# Patient Record
Sex: FEMALE | Race: WHITE | NOT HISPANIC OR LATINO | Employment: OTHER | URBAN - NONMETROPOLITAN AREA
[De-identification: names, ages, dates, MRNs, and addresses within clinical notes are randomized per-mention and may not be internally consistent; named-entity substitution may affect disease eponyms.]

---

## 2017-01-20 RX ORDER — HYDROXYZINE HYDROCHLORIDE 25 MG/1
TABLET, FILM COATED ORAL
Qty: 60 TABLET | Refills: 0 | Status: SHIPPED | OUTPATIENT
Start: 2017-01-20 | End: 2017-04-11 | Stop reason: SDUPTHER

## 2017-01-30 RX ORDER — NADOLOL 40 MG/1
20 TABLET ORAL DAILY
Qty: 15 TABLET | Refills: 30 | Status: SHIPPED | OUTPATIENT
Start: 2017-01-30 | End: 2017-01-31 | Stop reason: SDUPTHER

## 2017-01-31 RX ORDER — NADOLOL 40 MG/1
20 TABLET ORAL DAILY
Qty: 15 TABLET | Refills: 5 | Status: SHIPPED | OUTPATIENT
Start: 2017-01-31 | End: 2017-02-01 | Stop reason: DRUGHIGH

## 2017-02-01 ENCOUNTER — OFFICE VISIT (OUTPATIENT)
Dept: FAMILY MEDICINE CLINIC | Facility: CLINIC | Age: 82
End: 2017-02-01

## 2017-02-01 VITALS
OXYGEN SATURATION: 98 % | TEMPERATURE: 97.8 F | BODY MASS INDEX: 23.92 KG/M2 | DIASTOLIC BLOOD PRESSURE: 52 MMHG | WEIGHT: 130 LBS | HEIGHT: 62 IN | SYSTOLIC BLOOD PRESSURE: 134 MMHG | HEART RATE: 52 BPM

## 2017-02-01 DIAGNOSIS — I95.1 ORTHOSTATIC HYPOTENSION: Primary | ICD-10-CM

## 2017-02-01 DIAGNOSIS — K72.10 CHRONIC LIVER FAILURE WITHOUT HEPATIC COMA (HCC): ICD-10-CM

## 2017-02-01 DIAGNOSIS — I10 ESSENTIAL HYPERTENSION: ICD-10-CM

## 2017-02-01 PROCEDURE — 99214 OFFICE O/P EST MOD 30 MIN: CPT | Performed by: FAMILY MEDICINE

## 2017-02-01 RX ORDER — NADOLOL 20 MG/1
TABLET ORAL
Qty: 30 TABLET | Refills: 6 | Status: SHIPPED | OUTPATIENT
Start: 2017-02-01 | End: 2017-05-05 | Stop reason: CLARIF

## 2017-02-01 NOTE — PROGRESS NOTES
Subjective   Tahira Morrell is a 82 y.o. female.   Chief Complaint   Patient presents with   • Dizziness     at times it's really bad, patient has to sit or lie down. Has been going on for the past two week.        Dizziness   This is a recurrent problem. The current episode started 1 to 4 weeks ago. The problem occurs daily. The problem has been waxing and waning. Associated symptoms include abdominal pain, a change in bowel habit, fatigue, myalgias and weakness. The symptoms are aggravated by bending, standing and walking.        The following portions of the patient's history were reviewed and updated as appropriate: allergies, current medications, past family history, past medical history, past social history, past surgical history and problem list.    Review of Systems   Constitutional: Positive for fatigue.   HENT: Negative.    Eyes: Negative.    Respiratory: Negative.    Cardiovascular: Negative.    Gastrointestinal: Positive for abdominal pain and change in bowel habit.   Endocrine: Negative.    Genitourinary: Negative.    Musculoskeletal: Positive for myalgias.   Skin: Negative.    Allergic/Immunologic: Negative.    Neurological: Positive for dizziness and weakness.   Hematological: Negative.    Psychiatric/Behavioral: Negative.    All other systems reviewed and are negative.      Objective   Physical Exam   Constitutional: She is oriented to person, place, and time. She appears well-developed and well-nourished.   HENT:   Head: Normocephalic and atraumatic.   Right Ear: External ear normal.   Left Ear: External ear normal.   Nose: Nose normal.   Mouth/Throat: Oropharynx is clear and moist.   Eyes: Conjunctivae and EOM are normal. Pupils are equal, round, and reactive to light.   Neck: Normal range of motion. Neck supple.   Cardiovascular: Normal rate, regular rhythm, normal heart sounds and intact distal pulses.  Exam reveals no gallop and no friction rub.    No murmur heard.  Pulmonary/Chest: Effort  normal and breath sounds normal. She has no wheezes. She has no rales.   Abdominal: Soft. Bowel sounds are normal. She exhibits no mass. There is tenderness in the right upper quadrant. There is no rebound and no guarding.   Musculoskeletal: Normal range of motion.        Right knee: Tenderness found.        Left knee: Tenderness found.   Neurological: She is alert and oriented to person, place, and time. She has normal reflexes. No cranial nerve deficit. She exhibits normal muscle tone.   Skin: Skin is warm and dry. No rash noted.   Psychiatric: She has a normal mood and affect. Her speech is normal and behavior is normal. Judgment and thought content normal. Cognition and memory are normal.   Nursing note and vitals reviewed.    Orthostatics negative, bradycardia persists  Assessment/Plan   Tahira was seen today for dizziness.    Diagnoses and all orders for this visit:    Orthostatic hypotension    Essential hypertension    Chronic liver failure without hepatic coma    Other orders  -     nadolol (CORGARD) 20 MG tablet; 1/2 tablet by mouth daily        Obtain lab work that was done recently at her gastroenterologist office

## 2017-02-10 ENCOUNTER — TELEPHONE (OUTPATIENT)
Dept: FAMILY MEDICINE CLINIC | Facility: CLINIC | Age: 82
End: 2017-02-10

## 2017-02-10 NOTE — TELEPHONE ENCOUNTER
Talked to patient due to her concerns about her angina acting up, patient states it didn't start happening till she cut her nadolol back. I advised her to go to [facility] to make sure it wasn't anything more. Due to it being the weekend and doctor not being able to see her for her medication. Patient states she was going to the ER and Watson made her appointment for Monday at 11:00

## 2017-03-13 ENCOUNTER — OFFICE VISIT (OUTPATIENT)
Dept: FAMILY MEDICINE CLINIC | Facility: CLINIC | Age: 82
End: 2017-03-13

## 2017-03-13 VITALS
SYSTOLIC BLOOD PRESSURE: 132 MMHG | HEIGHT: 62 IN | DIASTOLIC BLOOD PRESSURE: 62 MMHG | RESPIRATION RATE: 18 BRPM | HEART RATE: 66 BPM | TEMPERATURE: 98.3 F | WEIGHT: 128 LBS | OXYGEN SATURATION: 95 % | BODY MASS INDEX: 23.55 KG/M2

## 2017-03-13 DIAGNOSIS — N30.00 ACUTE CYSTITIS WITHOUT HEMATURIA: Primary | ICD-10-CM

## 2017-03-13 DIAGNOSIS — K72.10 CHRONIC LIVER FAILURE WITHOUT HEPATIC COMA (HCC): ICD-10-CM

## 2017-03-13 DIAGNOSIS — R50.9 FEVER, UNSPECIFIED FEVER CAUSE: ICD-10-CM

## 2017-03-13 LAB
BILIRUB BLD-MCNC: NEGATIVE MG/DL
CLARITY, POC: CLEAR
COLOR UR: YELLOW
GLUCOSE UR STRIP-MCNC: NEGATIVE MG/DL
KETONES UR QL: NEGATIVE
LEUKOCYTE EST, POC: ABNORMAL
NITRITE UR-MCNC: NEGATIVE MG/ML
PH UR: 6.5 [PH] (ref 5–8)
PROT UR STRIP-MCNC: NEGATIVE MG/DL
RBC # UR STRIP: ABNORMAL /UL
SP GR UR: 1.03 (ref 1–1.03)
UROBILINOGEN UR QL: NORMAL

## 2017-03-13 PROCEDURE — 81003 URINALYSIS AUTO W/O SCOPE: CPT | Performed by: FAMILY MEDICINE

## 2017-03-13 PROCEDURE — 99214 OFFICE O/P EST MOD 30 MIN: CPT | Performed by: FAMILY MEDICINE

## 2017-03-13 RX ORDER — LEVOFLOXACIN 500 MG/1
500 TABLET, FILM COATED ORAL DAILY
Qty: 7 TABLET | Refills: 0 | Status: SHIPPED | OUTPATIENT
Start: 2017-03-13 | End: 2017-05-05

## 2017-03-13 NOTE — PROGRESS NOTES
Subjective   Tahira Morrell is a 82 y.o. female.   Chief Complaint   Patient presents with   • Illness     Fever and chest congestion. Started this weekend.        Illness   This is a new problem. The current episode started 1 to 4 weeks ago. The problem occurs daily. The problem has been gradually worsening. Associated symptoms include abdominal pain, chills, congestion, coughing, fatigue, a fever, headaches, nausea and a sore throat. Pertinent negatives include no rash, urinary symptoms or vomiting. The symptoms are aggravated by eating. She has tried rest (dicyclomine) for the symptoms. The treatment provided moderate relief.        The following portions of the patient's history were reviewed and updated as appropriate: allergies, current medications, past family history, past medical history, past social history, past surgical history and problem list.    Review of Systems   Constitutional: Positive for chills, fatigue and fever.   HENT: Positive for congestion, rhinorrhea and sore throat. Negative for ear pain and sinus pressure.    Eyes: Negative.  Negative for pain and itching.   Respiratory: Positive for cough.    Cardiovascular: Negative.    Gastrointestinal: Positive for abdominal pain, diarrhea and nausea. Negative for abdominal distention, blood in stool and vomiting.   Endocrine: Negative.  Negative for polydipsia and polyuria.   Genitourinary: Positive for pelvic pain. Negative for decreased urine volume, difficulty urinating, dysuria, frequency, hematuria and urgency.   Musculoskeletal: Negative.    Skin: Negative.  Negative for color change and rash.   Allergic/Immunologic: Negative.    Neurological: Positive for dizziness and headaches.   Hematological: Negative.    Psychiatric/Behavioral: Negative.  Negative for confusion and decreased concentration.   All other systems reviewed and are negative.      Objective   Physical Exam   Constitutional: She is oriented to person, place, and time. She  appears well-developed and well-nourished.   HENT:   Head: Normocephalic and atraumatic.   Right Ear: Tympanic membrane, external ear and ear canal normal. No middle ear effusion.   Left Ear: Tympanic membrane, external ear and ear canal normal.  No middle ear effusion.   Nose: Rhinorrhea present.   Mouth/Throat: Mucous membranes are normal. Posterior oropharyngeal erythema present. No oropharyngeal exudate.   Eyes: Conjunctivae and EOM are normal. Pupils are equal, round, and reactive to light.   Neck: Normal range of motion. Neck supple.   Cardiovascular: Normal rate, regular rhythm, normal heart sounds and intact distal pulses.  Exam reveals no gallop and no friction rub.    No murmur heard.  Pulmonary/Chest: Effort normal and breath sounds normal. No respiratory distress. She has no wheezes. She has no rales.   Abdominal: Soft. Bowel sounds are normal. She exhibits no distension and no mass. There is generalized tenderness and tenderness in the suprapubic area. There is no rebound and no guarding. No hernia.   Musculoskeletal: Normal range of motion.   Neurological: She is alert and oriented to person, place, and time. She has normal reflexes. No cranial nerve deficit. She exhibits normal muscle tone.   Skin: Skin is warm and dry. No rash noted. No erythema.   Psychiatric: She has a normal mood and affect. Her behavior is normal. Judgment and thought content normal.   Nursing note and vitals reviewed.    U/A reviewed    Assessment/Plan   Tahira was seen today for illness.    Diagnoses and all orders for this visit:    Acute cystitis without hematuria    Chronic liver failure without hepatic coma    Fever, unspecified fever cause  -     POCT urinalysis dipstick, automated    Other orders  -     levoFLOXacin (LEVAQUIN) 500 MG tablet; Take 1 tablet by mouth Daily.

## 2017-03-24 RX ORDER — SPIRONOLACTONE 50 MG/1
50 TABLET, FILM COATED ORAL 2 TIMES DAILY
Qty: 60 TABLET | Refills: 5 | Status: SHIPPED | OUTPATIENT
Start: 2017-03-24

## 2017-04-12 RX ORDER — HYDROXYZINE HYDROCHLORIDE 25 MG/1
TABLET, FILM COATED ORAL
Qty: 60 TABLET | Refills: 0 | Status: SHIPPED | OUTPATIENT
Start: 2017-04-12 | End: 2017-06-08 | Stop reason: SDUPTHER

## 2017-04-28 RX ORDER — SUCRALFATE 1 G/1
1 TABLET ORAL
Qty: 120 TABLET | Refills: 5 | Status: SHIPPED | OUTPATIENT
Start: 2017-04-28 | End: 2018-07-31 | Stop reason: SDUPTHER

## 2017-05-05 ENCOUNTER — LAB (OUTPATIENT)
Dept: LAB | Facility: OTHER | Age: 82
End: 2017-05-05

## 2017-05-05 ENCOUNTER — OFFICE VISIT (OUTPATIENT)
Dept: FAMILY MEDICINE CLINIC | Facility: CLINIC | Age: 82
End: 2017-05-05

## 2017-05-05 VITALS
OXYGEN SATURATION: 99 % | DIASTOLIC BLOOD PRESSURE: 60 MMHG | RESPIRATION RATE: 16 BRPM | HEART RATE: 68 BPM | WEIGHT: 130 LBS | BODY MASS INDEX: 23.92 KG/M2 | SYSTOLIC BLOOD PRESSURE: 122 MMHG | TEMPERATURE: 97.9 F | HEIGHT: 62 IN

## 2017-05-05 DIAGNOSIS — R18.8 CIRRHOSIS OF LIVER WITH ASCITES, UNSPECIFIED HEPATIC CIRRHOSIS TYPE (HCC): ICD-10-CM

## 2017-05-05 DIAGNOSIS — K72.10 CHRONIC LIVER FAILURE WITHOUT HEPATIC COMA (HCC): ICD-10-CM

## 2017-05-05 DIAGNOSIS — K74.60 CIRRHOSIS OF LIVER WITH ASCITES, UNSPECIFIED HEPATIC CIRRHOSIS TYPE (HCC): ICD-10-CM

## 2017-05-05 DIAGNOSIS — I10 ESSENTIAL HYPERTENSION: ICD-10-CM

## 2017-05-05 DIAGNOSIS — E11.8 TYPE 2 DIABETES MELLITUS WITH COMPLICATION, WITHOUT LONG-TERM CURRENT USE OF INSULIN (HCC): ICD-10-CM

## 2017-05-05 DIAGNOSIS — R50.9 FEVER, UNSPECIFIED FEVER CAUSE: ICD-10-CM

## 2017-05-05 DIAGNOSIS — N12 PYELONEPHRITIS: Primary | ICD-10-CM

## 2017-05-05 LAB
ALBUMIN SERPL-MCNC: 3.6 G/DL (ref 3.2–5.5)
ALBUMIN/GLOB SERPL: 0.9 G/DL (ref 1–3)
ALP SERPL-CCNC: 68 U/L (ref 15–121)
ALT SERPL W P-5'-P-CCNC: 22 U/L (ref 10–60)
AMMONIA BLD-SCNC: 19 UMOL/L (ref 9–30)
ANION GAP SERPL CALCULATED.3IONS-SCNC: 10 MMOL/L (ref 5–15)
AST SERPL-CCNC: 31 U/L (ref 10–60)
BILIRUB BLD-MCNC: NEGATIVE MG/DL
BILIRUB SERPL-MCNC: 1 MG/DL (ref 0.2–1)
BUN BLD-MCNC: 19 MG/DL (ref 8–25)
BUN/CREAT SERPL: 23.8 (ref 7–25)
CALCIUM SPEC-SCNC: 9.1 MG/DL (ref 8.4–10.8)
CHLORIDE SERPL-SCNC: 107 MMOL/L (ref 100–112)
CHOLEST SERPL-MCNC: 196 MG/DL (ref 150–200)
CLARITY, POC: CLEAR
CO2 SERPL-SCNC: 27 MMOL/L (ref 20–32)
COLOR UR: ABNORMAL
CREAT BLD-MCNC: 0.8 MG/DL (ref 0.4–1.3)
DEPRECATED RDW RBC AUTO: 46.1 FL (ref 36.4–46.3)
EOSINOPHIL # BLD MANUAL: 0.12 10*3/MM3 (ref 0–0.7)
EOSINOPHIL NFR BLD MANUAL: 3 % (ref 0–7)
ERYTHROCYTE [DISTWIDTH] IN BLOOD BY AUTOMATED COUNT: 13.1 % (ref 11.5–14.5)
GFR SERPL CREATININE-BSD FRML MDRD: 69 ML/MIN/1.73 (ref 39–90)
GLOBULIN UR ELPH-MCNC: 3.8 GM/DL (ref 2.5–4.6)
GLUCOSE BLD-MCNC: 106 MG/DL (ref 70–100)
GLUCOSE UR STRIP-MCNC: NEGATIVE MG/DL
HBA1C MFR BLD: 5.56 % (ref 4–5.6)
HCT VFR BLD AUTO: 37.6 % (ref 35–45)
HDLC SERPL-MCNC: 55 MG/DL (ref 35–100)
HGB BLD-MCNC: 13 G/DL (ref 12–15.5)
KETONES UR QL: NEGATIVE
LARGE PLATELETS: ABNORMAL
LDLC SERPL CALC-MCNC: 126 MG/DL
LDLC/HDLC SERPL: 2.29 {RATIO}
LEUKOCYTE EST, POC: ABNORMAL
LYMPHOCYTES # BLD MANUAL: 1.5 10*3/MM3 (ref 0.6–4.2)
LYMPHOCYTES NFR BLD MANUAL: 14 % (ref 0–12)
LYMPHOCYTES NFR BLD MANUAL: 39 % (ref 10–50)
MCH RBC QN AUTO: 34.9 PG (ref 26.5–34)
MCHC RBC AUTO-ENTMCNC: 34.6 G/DL (ref 31.4–36)
MCV RBC AUTO: 100.8 FL (ref 80–98)
MONOCYTES # BLD AUTO: 0.54 10*3/MM3 (ref 0–0.9)
NEUTROPHILS # BLD AUTO: 1.69 10*3/MM3 (ref 2–8.6)
NEUTROPHILS NFR BLD MANUAL: 44 % (ref 37–80)
NITRITE UR-MCNC: NEGATIVE MG/ML
PH UR: 6 [PH] (ref 5–8)
PLATELET # BLD AUTO: 83 10*3/MM3 (ref 150–450)
PMV BLD AUTO: 11.5 FL (ref 8–12)
POTASSIUM BLD-SCNC: 4.9 MMOL/L (ref 3.4–5.4)
PROT SERPL-MCNC: 7.4 G/DL (ref 6.7–8.2)
PROT UR STRIP-MCNC: NEGATIVE MG/DL
RBC # BLD AUTO: 3.73 10*6/MM3 (ref 3.77–5.16)
RBC # UR STRIP: ABNORMAL /UL
RBC MORPH BLD: NORMAL
SMALL PLATELETS BLD QL SMEAR: ABNORMAL
SODIUM BLD-SCNC: 144 MMOL/L (ref 134–146)
SP GR UR: 1.01 (ref 1–1.03)
TRIGL SERPL-MCNC: 75 MG/DL (ref 35–160)
UROBILINOGEN UR QL: NORMAL
VLDLC SERPL-MCNC: 15 MG/DL
WBC MORPH BLD: NORMAL
WBC NRBC COR # BLD: 3.84 10*3/MM3 (ref 3.2–9.8)

## 2017-05-05 PROCEDURE — 36415 COLL VENOUS BLD VENIPUNCTURE: CPT | Performed by: FAMILY MEDICINE

## 2017-05-05 PROCEDURE — 99214 OFFICE O/P EST MOD 30 MIN: CPT | Performed by: FAMILY MEDICINE

## 2017-05-05 PROCEDURE — 96372 THER/PROPH/DIAG INJ SC/IM: CPT | Performed by: FAMILY MEDICINE

## 2017-05-05 PROCEDURE — 80061 LIPID PANEL: CPT | Performed by: FAMILY MEDICINE

## 2017-05-05 PROCEDURE — 82140 ASSAY OF AMMONIA: CPT | Performed by: FAMILY MEDICINE

## 2017-05-05 PROCEDURE — 81003 URINALYSIS AUTO W/O SCOPE: CPT | Performed by: FAMILY MEDICINE

## 2017-05-05 PROCEDURE — 85025 COMPLETE CBC W/AUTO DIFF WBC: CPT | Performed by: FAMILY MEDICINE

## 2017-05-05 PROCEDURE — 83036 HEMOGLOBIN GLYCOSYLATED A1C: CPT | Performed by: FAMILY MEDICINE

## 2017-05-05 PROCEDURE — 80053 COMPREHEN METABOLIC PANEL: CPT | Performed by: FAMILY MEDICINE

## 2017-05-05 RX ORDER — CIPROFLOXACIN 500 MG/1
500 TABLET, FILM COATED ORAL 2 TIMES DAILY
Qty: 14 TABLET | Refills: 0 | Status: SHIPPED | OUTPATIENT
Start: 2017-05-05 | End: 2017-05-12 | Stop reason: SDUPTHER

## 2017-05-05 RX ORDER — METOPROLOL SUCCINATE 25 MG/1
1 TABLET, EXTENDED RELEASE ORAL DAILY
COMMUNITY
Start: 2017-04-26

## 2017-05-05 RX ORDER — CEFTRIAXONE 1 G/1
1 INJECTION, POWDER, FOR SOLUTION INTRAMUSCULAR; INTRAVENOUS ONCE
Status: COMPLETED | OUTPATIENT
Start: 2017-05-05 | End: 2017-05-05

## 2017-05-05 RX ADMIN — CEFTRIAXONE 1 G: 1 INJECTION, POWDER, FOR SOLUTION INTRAMUSCULAR; INTRAVENOUS at 11:00

## 2017-05-12 ENCOUNTER — OFFICE VISIT (OUTPATIENT)
Dept: FAMILY MEDICINE CLINIC | Facility: CLINIC | Age: 82
End: 2017-05-12

## 2017-05-12 VITALS
HEART RATE: 60 BPM | WEIGHT: 130 LBS | OXYGEN SATURATION: 99 % | SYSTOLIC BLOOD PRESSURE: 124 MMHG | TEMPERATURE: 97.2 F | DIASTOLIC BLOOD PRESSURE: 80 MMHG | BODY MASS INDEX: 23.92 KG/M2 | HEIGHT: 62 IN

## 2017-05-12 DIAGNOSIS — D69.6 THROMBOCYTOPENIA (HCC): ICD-10-CM

## 2017-05-12 DIAGNOSIS — K72.10 CHRONIC LIVER FAILURE WITHOUT HEPATIC COMA (HCC): ICD-10-CM

## 2017-05-12 DIAGNOSIS — E53.8 B12 DEFICIENCY: ICD-10-CM

## 2017-05-12 DIAGNOSIS — N12 PYELONEPHRITIS: ICD-10-CM

## 2017-05-12 DIAGNOSIS — I10 ESSENTIAL HYPERTENSION: Primary | ICD-10-CM

## 2017-05-12 DIAGNOSIS — F33.0 MILD EPISODE OF RECURRENT MAJOR DEPRESSIVE DISORDER (HCC): ICD-10-CM

## 2017-05-12 DIAGNOSIS — E11.9 TYPE 2 DIABETES MELLITUS WITHOUT COMPLICATION, WITHOUT LONG-TERM CURRENT USE OF INSULIN (HCC): ICD-10-CM

## 2017-05-12 PROCEDURE — 99214 OFFICE O/P EST MOD 30 MIN: CPT | Performed by: FAMILY MEDICINE

## 2017-05-12 RX ORDER — CIPROFLOXACIN 500 MG/1
500 TABLET, FILM COATED ORAL 2 TIMES DAILY
Qty: 14 TABLET | Refills: 0 | Status: SHIPPED | OUTPATIENT
Start: 2017-05-12 | End: 2017-11-14

## 2017-05-15 RX ORDER — FLUTICASONE PROPIONATE 50 MCG
SPRAY, SUSPENSION (ML) NASAL
Qty: 16 G | Refills: 1 | Status: SHIPPED | OUTPATIENT
Start: 2017-05-15

## 2017-06-08 RX ORDER — HYDROXYZINE HYDROCHLORIDE 25 MG/1
TABLET, FILM COATED ORAL
Qty: 60 TABLET | Refills: 0 | Status: SHIPPED | OUTPATIENT
Start: 2017-06-08 | End: 2017-07-24 | Stop reason: SDUPTHER

## 2017-07-25 RX ORDER — HYDROXYZINE HYDROCHLORIDE 25 MG/1
TABLET, FILM COATED ORAL
Qty: 60 TABLET | Refills: 0 | Status: SHIPPED | OUTPATIENT
Start: 2017-07-25 | End: 2017-09-25 | Stop reason: SDUPTHER

## 2017-09-25 RX ORDER — HYDROXYZINE HYDROCHLORIDE 25 MG/1
TABLET, FILM COATED ORAL
Qty: 60 TABLET | Refills: 5 | Status: SHIPPED | OUTPATIENT
Start: 2017-09-25 | End: 2018-10-13 | Stop reason: SDUPTHER

## 2017-11-07 ENCOUNTER — LAB (OUTPATIENT)
Dept: LAB | Facility: OTHER | Age: 82
End: 2017-11-07

## 2017-11-07 DIAGNOSIS — D69.6 THROMBOCYTOPENIA (HCC): ICD-10-CM

## 2017-11-07 DIAGNOSIS — E11.9 TYPE 2 DIABETES MELLITUS WITHOUT COMPLICATION, WITHOUT LONG-TERM CURRENT USE OF INSULIN (HCC): ICD-10-CM

## 2017-11-07 LAB
ALBUMIN SERPL-MCNC: 3.5 G/DL (ref 3.2–5.5)
ALBUMIN/GLOB SERPL: 0.9 G/DL (ref 1–3)
ALP SERPL-CCNC: 85 U/L (ref 15–121)
ALT SERPL W P-5'-P-CCNC: 20 U/L (ref 10–60)
ANION GAP SERPL CALCULATED.3IONS-SCNC: 8 MMOL/L (ref 5–15)
ANISOCYTOSIS BLD QL: NORMAL
ARTICHOKE IGE QN: 114 MG/DL (ref 0–129)
AST SERPL-CCNC: 37 U/L (ref 10–60)
BILIRUB SERPL-MCNC: 1.3 MG/DL (ref 0.2–1)
BUN BLD-MCNC: 12 MG/DL (ref 8–25)
BUN/CREAT SERPL: 15 (ref 7–25)
CALCIUM SPEC-SCNC: 8.8 MG/DL (ref 8.4–10.8)
CHLORIDE SERPL-SCNC: 108 MMOL/L (ref 100–112)
CO2 SERPL-SCNC: 26 MMOL/L (ref 20–32)
CREAT BLD-MCNC: 0.8 MG/DL (ref 0.4–1.3)
DEPRECATED RDW RBC AUTO: 51.5 FL (ref 36.4–46.3)
EOSINOPHIL # BLD MANUAL: 0.2 10*3/MM3 (ref 0–0.7)
EOSINOPHIL NFR BLD MANUAL: 5 % (ref 0–7)
ERYTHROCYTE [DISTWIDTH] IN BLOOD BY AUTOMATED COUNT: 15.6 % (ref 11.5–14.5)
GFR SERPL CREATININE-BSD FRML MDRD: 69 ML/MIN/1.73 (ref 39–90)
GLOBULIN UR ELPH-MCNC: 4 GM/DL (ref 2.5–4.6)
GLUCOSE BLD-MCNC: 105 MG/DL (ref 70–100)
HBA1C MFR BLD: 5.5 % (ref 4–5.6)
HCT VFR BLD AUTO: 38.2 % (ref 35–45)
HGB BLD-MCNC: 12.3 G/DL (ref 12–15.5)
LYMPHOCYTES # BLD MANUAL: 0.98 10*3/MM3 (ref 0.6–4.2)
LYMPHOCYTES NFR BLD MANUAL: 25 % (ref 10–50)
LYMPHOCYTES NFR BLD MANUAL: 6 % (ref 0–12)
MCH RBC QN AUTO: 30.8 PG (ref 26.5–34)
MCHC RBC AUTO-ENTMCNC: 32.2 G/DL (ref 31.4–36)
MCV RBC AUTO: 95.5 FL (ref 80–98)
MONOCYTES # BLD AUTO: 0.23 10*3/MM3 (ref 0–0.9)
NEUTROPHILS # BLD AUTO: 2.5 10*3/MM3 (ref 2–8.6)
NEUTROPHILS NFR BLD MANUAL: 64 % (ref 37–80)
PLATELET # BLD AUTO: 96 10*3/MM3 (ref 150–450)
PMV BLD AUTO: 11.5 FL (ref 8–12)
POTASSIUM BLD-SCNC: 4.2 MMOL/L (ref 3.4–5.4)
PROT SERPL-MCNC: 7.5 G/DL (ref 6.7–8.2)
RBC # BLD AUTO: 4 10*6/MM3 (ref 3.77–5.16)
SMALL PLATELETS BLD QL SMEAR: NORMAL
SODIUM BLD-SCNC: 142 MMOL/L (ref 134–146)
WBC MORPH BLD: NORMAL
WBC NRBC COR # BLD: 3.9 10*3/MM3 (ref 3.2–9.8)

## 2017-11-07 PROCEDURE — 85025 COMPLETE CBC W/AUTO DIFF WBC: CPT | Performed by: FAMILY MEDICINE

## 2017-11-07 PROCEDURE — 83721 ASSAY OF BLOOD LIPOPROTEIN: CPT | Performed by: FAMILY MEDICINE

## 2017-11-07 PROCEDURE — 80053 COMPREHEN METABOLIC PANEL: CPT | Performed by: FAMILY MEDICINE

## 2017-11-07 PROCEDURE — 36415 COLL VENOUS BLD VENIPUNCTURE: CPT | Performed by: FAMILY MEDICINE

## 2017-11-07 PROCEDURE — 83036 HEMOGLOBIN GLYCOSYLATED A1C: CPT | Performed by: FAMILY MEDICINE

## 2017-11-14 ENCOUNTER — OFFICE VISIT (OUTPATIENT)
Dept: FAMILY MEDICINE CLINIC | Facility: CLINIC | Age: 82
End: 2017-11-14

## 2017-11-14 VITALS
OXYGEN SATURATION: 97 % | TEMPERATURE: 98.7 F | BODY MASS INDEX: 26.31 KG/M2 | HEART RATE: 70 BPM | WEIGHT: 143 LBS | DIASTOLIC BLOOD PRESSURE: 76 MMHG | SYSTOLIC BLOOD PRESSURE: 116 MMHG | RESPIRATION RATE: 16 BRPM | HEIGHT: 62 IN

## 2017-11-14 DIAGNOSIS — F33.0 MILD EPISODE OF RECURRENT MAJOR DEPRESSIVE DISORDER (HCC): ICD-10-CM

## 2017-11-14 DIAGNOSIS — E11.9 TYPE 2 DIABETES MELLITUS WITHOUT COMPLICATION, WITHOUT LONG-TERM CURRENT USE OF INSULIN (HCC): ICD-10-CM

## 2017-11-14 DIAGNOSIS — R18.8 OTHER ASCITES: ICD-10-CM

## 2017-11-14 DIAGNOSIS — I10 ESSENTIAL HYPERTENSION: ICD-10-CM

## 2017-11-14 DIAGNOSIS — K72.10 CHRONIC LIVER FAILURE WITHOUT HEPATIC COMA (HCC): Primary | ICD-10-CM

## 2017-11-14 PROCEDURE — 99214 OFFICE O/P EST MOD 30 MIN: CPT | Performed by: FAMILY MEDICINE

## 2017-11-14 RX ORDER — CIPROFLOXACIN 500 MG/1
500 TABLET, FILM COATED ORAL 2 TIMES DAILY
Qty: 14 TABLET | Refills: 0 | Status: SHIPPED | OUTPATIENT
Start: 2017-11-14 | End: 2018-01-23

## 2017-11-14 RX ORDER — DULOXETIN HYDROCHLORIDE 30 MG/1
30 CAPSULE, DELAYED RELEASE ORAL DAILY
Qty: 30 CAPSULE | Refills: 0 | Status: SHIPPED | OUTPATIENT
Start: 2017-11-14 | End: 2017-12-12 | Stop reason: SINTOL

## 2017-11-14 NOTE — PROGRESS NOTES
Subjective   Tahira Morrell is a 83 y.o. female who presents to the office for follow-up and review of labs.     History of Present Illness   Patient with multiple medical problems including hypertension, type 2 diabetes, and cirrhosis of the liver is in today for reevaluation and to review labs.  Patient has several issues going on at this time.  She has had increased abdominal pain over the last 2 months requiring visits ×2 to the emergency department.  A CT of abdomen and pelvis were done at last visit showing mucosal edema in the residual: With infectious versus inflammatory versus ischemic etiology.  She is having difficulty emptying her bowels.  Also is having increased depression with frequent crying spells.  Additional history has been obtained from review of hospital records including CT report.    The following portions of the patient's history were reviewed and updated as appropriate: allergies, current medications, past family history, past medical history, past social history, past surgical history and problem list.    Review of Systems   Constitutional: Positive for activity change and fatigue.   HENT: Negative.    Eyes: Negative.    Respiratory: Negative.    Cardiovascular: Negative.    Gastrointestinal: Positive for abdominal distention, constipation, diarrhea and nausea.   Endocrine: Negative.    Genitourinary: Negative.    Musculoskeletal: Positive for arthralgias and back pain.   Skin: Negative.    Allergic/Immunologic: Negative.    Neurological: Negative.    Hematological: Negative.    Psychiatric/Behavioral: Negative.    All other systems reviewed and are negative.      Objective   Physical Exam   Constitutional: She is oriented to person, place, and time. She appears well-developed and well-nourished.   HENT:   Head: Normocephalic and atraumatic.   Right Ear: External ear normal.   Left Ear: External ear normal.   Nose: Nose normal.   Mouth/Throat: Oropharynx is clear and moist.   Eyes:  Conjunctivae and EOM are normal. Pupils are equal, round, and reactive to light.   Neck: Normal range of motion. Neck supple.   Cardiovascular: Normal rate, regular rhythm, normal heart sounds and intact distal pulses.  Exam reveals no gallop and no friction rub.    No murmur heard.  Pulmonary/Chest: Effort normal and breath sounds normal. She has no wheezes. She has no rales.   Abdominal: Soft. Bowel sounds are normal. She exhibits mass. There is tenderness. There is no rebound and no guarding. A hernia is present.   Musculoskeletal: Normal range of motion.   Neurological: She is alert and oriented to person, place, and time. She has normal reflexes. No cranial nerve deficit. She exhibits normal muscle tone.   Skin: Skin is warm and dry. No rash noted.   Psychiatric: Her behavior is normal. Judgment and thought content normal. Her mood appears anxious. She exhibits a depressed mood.   Nursing note and vitals reviewed.      Assessment/Plan   Tahira was seen today for hypertension and diabetes.    Diagnoses and all orders for this visit:    Chronic liver failure without hepatic coma    Essential hypertension    Type 2 diabetes mellitus without complication, without long-term current use of insulin    Mild episode of recurrent major depressive disorder    Other ascites    Other orders  -     ciprofloxacin (CIPRO) 500 MG tablet; Take 1 tablet by mouth 2 (Two) Times a Day.  -     DULoxetine (CYMBALTA) 30 MG capsule; Take 1 capsule by mouth Daily.         Labs are reviewed with patient.    PHQ-2/PHQ-9 Depression Screening 11/14/2017   Little interest or pleasure in doing things 3   Feeling down, depressed, or hopeless 3   Trouble falling or staying asleep, or sleeping too much 3   Feeling tired or having little energy 3   Poor appetite or overeating 3   Feeling bad about yourself - or that you are a failure or have let yourself or your family down 3   Trouble concentrating on things, such as reading the newspaper or  watching television 3   Moving or speaking so slowly that other people could have noticed. Or the opposite - being so fidgety or restless that you have been moving around a lot more than usual 3   Thoughts that you would be better off dead, or of hurting yourself in some way 3   Total Score 27   If you checked off any problems, how difficult have these problems made it for you to do your work, take care of things at home, or get along with other people? Extremely dIfficult         Lab on 11/07/2017   Component Date Value Ref Range Status   • Glucose 11/07/2017 105* 70 - 100 mg/dL Final   • BUN 11/07/2017 12  8 - 25 mg/dL Final   • Creatinine 11/07/2017 0.80  0.40 - 1.30 mg/dL Final   • Sodium 11/07/2017 142  134 - 146 mmol/L Final   • Potassium 11/07/2017 4.2  3.4 - 5.4 mmol/L Final   • Chloride 11/07/2017 108  100 - 112 mmol/L Final   • CO2 11/07/2017 26.0  20.0 - 32.0 mmol/L Final   • Calcium 11/07/2017 8.8  8.4 - 10.8 mg/dL Final   • Total Protein 11/07/2017 7.5  6.7 - 8.2 g/dL Final   • Albumin 11/07/2017 3.50  3.20 - 5.50 g/dL Final   • ALT (SGPT) 11/07/2017 20  10 - 60 U/L Final   • AST (SGOT) 11/07/2017 37  10 - 60 U/L Final   • Alkaline Phosphatase 11/07/2017 85  15 - 121 U/L Final   • Total Bilirubin 11/07/2017 1.3* 0.2 - 1.0 mg/dL Final   • eGFR Non African Amer 11/07/2017 69  39 - 90 mL/min/1.73 Final   • Globulin 11/07/2017 4.0  2.5 - 4.6 gm/dL Final   • A/G Ratio 11/07/2017 0.9* 1.0 - 3.0 g/dL Final   • BUN/Creatinine Ratio 11/07/2017 15.0  7.0 - 25.0 Final   • Anion Gap 11/07/2017 8.0  5.0 - 15.0 mmol/L Final   • Hemoglobin A1C 11/07/2017 5.5  4 - 5.6 % Final   • LDL Cholesterol  11/07/2017 114  0 - 129 mg/dL Final   • WBC 11/07/2017 3.90  3.20 - 9.80 10*3/mm3 Final   • RBC 11/07/2017 4.00  3.77 - 5.16 10*6/mm3 Final   • Hemoglobin 11/07/2017 12.3  12.0 - 15.5 g/dL Final   • Hematocrit 11/07/2017 38.2  35.0 - 45.0 % Final   • MCV 11/07/2017 95.5  80.0 - 98.0 fL Final   • MCH 11/07/2017 30.8  26.5 -  34.0 pg Final   • MCHC 11/07/2017 32.2  31.4 - 36.0 g/dL Final   • RDW 11/07/2017 15.6* 11.5 - 14.5 % Final   • RDW-SD 11/07/2017 51.5* 36.4 - 46.3 fl Final   • MPV 11/07/2017 11.5  8.0 - 12.0 fL Final   • Platelets 11/07/2017 96* 150 - 450 10*3/mm3 Final   • Neutrophil % 11/07/2017 64.0  37.0 - 80.0 % Final   • Lymphocyte % 11/07/2017 25.0  10.0 - 50.0 % Final   • Monocyte % 11/07/2017 6.0  0.0 - 12.0 % Final   • Eosinophil % 11/07/2017 5.0  0.0 - 7.0 % Final   • Neutrophils Absolute 11/07/2017 2.50  2.00 - 8.60 10*3/mm3 Final   • Lymphocytes Absolute 11/07/2017 0.98  0.60 - 4.20 10*3/mm3 Final   • Monocytes Absolute 11/07/2017 0.23  0.00 - 0.90 10*3/mm3 Final   • Eosinophils Absolute 11/07/2017 0.20  0.00 - 0.70 10*3/mm3 Final   • Anisocytosis 11/07/2017 Slight/1+  None Seen Final   • WBC Morphology 11/07/2017 Normal  Normal Final   • Platelet Estimate 11/07/2017 Decreased  Normal Final   Admission on 07/27/2017, Discharged on 07/27/2017   Component Date Value Ref Range Status   • Color 07/27/2017 Sudha  Yellow, Straw, Dark Yellow, Sudha Final   • Clarity, UA 07/27/2017 Slightly Cloudy* Clear Final   • Glucose, UA 07/27/2017 Negative  Negative, 1000 mg/dL (3+) mg/dL Final   • Bilirubin 07/27/2017 1 mg/dL* Negative Final   • Ketones, UA 07/27/2017 Trace* Negative Final   • Specific Gravity  07/27/2017 1.030  1.005 - 1.030 Final   • Blood, UA 07/27/2017 1+* Negative Final   • pH, Urine 07/27/2017 5.5  5.0 - 8.0 Final   • Protein, POC 07/27/2017 1+* Negative mg/dL Final   • Urobilinogen, UA 07/27/2017 Normal  Normal Final   • Leukocytes 07/27/2017 Trace* Negative Final   • Nitrite, UA 07/27/2017 Negative  Negative Final   ]

## 2017-12-12 ENCOUNTER — OFFICE VISIT (OUTPATIENT)
Dept: FAMILY MEDICINE CLINIC | Facility: CLINIC | Age: 82
End: 2017-12-12

## 2017-12-12 VITALS
BODY MASS INDEX: 25.21 KG/M2 | HEART RATE: 63 BPM | HEIGHT: 62 IN | DIASTOLIC BLOOD PRESSURE: 76 MMHG | OXYGEN SATURATION: 98 % | WEIGHT: 137 LBS | TEMPERATURE: 98.2 F | SYSTOLIC BLOOD PRESSURE: 124 MMHG

## 2017-12-12 DIAGNOSIS — F33.0 MILD EPISODE OF RECURRENT MAJOR DEPRESSIVE DISORDER (HCC): Primary | ICD-10-CM

## 2017-12-12 DIAGNOSIS — I10 ESSENTIAL HYPERTENSION: ICD-10-CM

## 2017-12-12 DIAGNOSIS — K72.10 CHRONIC LIVER FAILURE WITHOUT HEPATIC COMA (HCC): ICD-10-CM

## 2017-12-12 PROCEDURE — 99213 OFFICE O/P EST LOW 20 MIN: CPT | Performed by: FAMILY MEDICINE

## 2017-12-12 NOTE — PROGRESS NOTES
Subjective   Tahira Morrell is a 83 y.o. female.   Chief Complaint   Patient presents with   • Follow-up     4 wk       Depression   Visit Type: follow-up  Patient presents with the following symptoms: depressed mood, fatigue, insomnia and nervousness/anxiety.  Patient is not experiencing: suicidal ideas and suicidal planning.  Frequency of symptoms: most days   Severity: causing significant distress   Sleep quality: poor  Nighttime awakenings: several  Side effects:  Insomnia (tremor)       The following portions of the patient's history were reviewed and updated as appropriate: allergies, current medications, past family history, past medical history, past social history, past surgical history and problem list.    Review of Systems   Constitutional: Positive for activity change and fatigue.   HENT: Negative.    Eyes: Negative.    Respiratory: Negative.    Cardiovascular: Negative.    Gastrointestinal: Positive for abdominal distention, constipation, diarrhea and nausea.   Endocrine: Negative.    Genitourinary: Negative.    Musculoskeletal: Positive for arthralgias and back pain.   Skin: Negative.    Allergic/Immunologic: Negative.    Neurological: Negative.    Hematological: Negative.    Psychiatric/Behavioral: Positive for dysphoric mood. Negative for suicidal ideas. The patient is nervous/anxious and has insomnia.    All other systems reviewed and are negative.      Objective   Physical Exam   Constitutional: She is oriented to person, place, and time. She appears well-developed and well-nourished.   HENT:   Head: Normocephalic and atraumatic.   Right Ear: External ear normal.   Left Ear: External ear normal.   Nose: Nose normal.   Mouth/Throat: Oropharynx is clear and moist.   Eyes: Conjunctivae and EOM are normal. Pupils are equal, round, and reactive to light.   Neck: Normal range of motion. Neck supple.   Cardiovascular: Normal rate, regular rhythm, normal heart sounds and intact distal pulses.  Exam  reveals no gallop and no friction rub.    No murmur heard.  Pulmonary/Chest: Effort normal and breath sounds normal. She has no wheezes. She has no rales.   Abdominal: Soft. Bowel sounds are normal. She exhibits mass. There is tenderness. There is no rebound and no guarding. A hernia is present.   Musculoskeletal: Normal range of motion.   Neurological: She is alert and oriented to person, place, and time. She has normal reflexes. No cranial nerve deficit. She exhibits normal muscle tone.   Skin: Skin is warm and dry. No rash noted.   Psychiatric: Her behavior is normal. Judgment and thought content normal. Her mood appears anxious. She exhibits a depressed mood.   Nursing note and vitals reviewed.      Assessment/Plan   Tahira was seen today for follow-up.    Diagnoses and all orders for this visit:    Mild episode of recurrent major depressive disorder    Essential hypertension    Chronic liver failure without hepatic coma    Other orders  -     sertraline (ZOLOFT) 50 MG tablet; Take 1 tablet by mouth Daily.

## 2018-01-02 RX ORDER — OMEPRAZOLE 20 MG/1
CAPSULE, DELAYED RELEASE ORAL
Qty: 60 CAPSULE | Refills: 5 | Status: SHIPPED | OUTPATIENT
Start: 2018-01-02

## 2018-01-02 RX ORDER — DICYCLOMINE HCL 20 MG
TABLET ORAL
Qty: 120 TABLET | Refills: 5 | Status: SHIPPED | OUTPATIENT
Start: 2018-01-02

## 2018-01-23 ENCOUNTER — OFFICE VISIT (OUTPATIENT)
Dept: FAMILY MEDICINE CLINIC | Facility: CLINIC | Age: 83
End: 2018-01-23

## 2018-01-23 VITALS
SYSTOLIC BLOOD PRESSURE: 112 MMHG | RESPIRATION RATE: 16 BRPM | BODY MASS INDEX: 25.73 KG/M2 | DIASTOLIC BLOOD PRESSURE: 60 MMHG | TEMPERATURE: 96.2 F | HEIGHT: 62 IN | OXYGEN SATURATION: 95 % | WEIGHT: 139.8 LBS | HEART RATE: 68 BPM

## 2018-01-23 DIAGNOSIS — F33.0 MILD EPISODE OF RECURRENT MAJOR DEPRESSIVE DISORDER (HCC): Primary | ICD-10-CM

## 2018-01-23 DIAGNOSIS — K72.10 CHRONIC LIVER FAILURE WITHOUT HEPATIC COMA (HCC): ICD-10-CM

## 2018-01-23 DIAGNOSIS — I10 ESSENTIAL HYPERTENSION: ICD-10-CM

## 2018-01-23 PROCEDURE — 99213 OFFICE O/P EST LOW 20 MIN: CPT | Performed by: FAMILY MEDICINE

## 2018-01-23 RX ORDER — SERTRALINE HYDROCHLORIDE 100 MG/1
100 TABLET, FILM COATED ORAL DAILY
Qty: 30 TABLET | Refills: 5 | Status: SHIPPED | OUTPATIENT
Start: 2018-01-23 | End: 2018-02-20 | Stop reason: SINTOL

## 2018-01-23 NOTE — PROGRESS NOTES
Subjective   Tahira Morrell is a 83 y.o. female.   Chief Complaint   Patient presents with   • Depression     6 wk f/u   • Abdominal Pain     patient has been having lower abdomen pain.       Depression   Visit Type: follow-up  Patient presents with the following symptoms: fatigue, feelings of worthlessness and nervousness/anxiety.  Patient is not experiencing: anhedonia, depressed mood, excessive worry, insomnia, irritability, obsessions, panic, suicidal ideas, suicidal planning and thoughts of death.  Frequency of symptoms: rarely   Severity: mild   Sleep quality: fair  Nighttime awakenings: occasional  Compliance with medications:  %  Treatment side effects: none.       The following portions of the patient's history were reviewed and updated as appropriate: allergies, current medications, past family history, past medical history, past social history, past surgical history and problem list.    Review of Systems   Constitutional: Positive for activity change and fatigue. Negative for irritability.   HENT: Negative.    Eyes: Negative.    Respiratory: Negative.    Cardiovascular: Negative.    Gastrointestinal: Positive for abdominal distention, constipation, diarrhea and nausea.   Endocrine: Negative.    Genitourinary: Negative.    Musculoskeletal: Positive for arthralgias and back pain.   Skin: Negative.    Allergic/Immunologic: Negative.    Neurological: Negative.    Hematological: Negative.    Psychiatric/Behavioral: Positive for dysphoric mood. Negative for suicidal ideas. The patient is nervous/anxious. The patient does not have insomnia.    All other systems reviewed and are negative.      Objective   Physical Exam   Constitutional: She is oriented to person, place, and time. She appears well-developed and well-nourished.   HENT:   Head: Normocephalic and atraumatic.   Right Ear: External ear normal.   Left Ear: External ear normal.   Nose: Nose normal.   Mouth/Throat: Oropharynx is clear and moist.    Eyes: Conjunctivae and EOM are normal. Pupils are equal, round, and reactive to light.   Neck: Normal range of motion. Neck supple.   Cardiovascular: Normal rate, regular rhythm, normal heart sounds and intact distal pulses.  Exam reveals no gallop and no friction rub.    No murmur heard.  Pulmonary/Chest: Effort normal and breath sounds normal. She has no wheezes. She has no rales.   Abdominal: Soft. Bowel sounds are normal. She exhibits mass. There is tenderness. There is no rebound and no guarding. A hernia is present.   Musculoskeletal: Normal range of motion.   Neurological: She is alert and oriented to person, place, and time. She has normal reflexes. No cranial nerve deficit. She exhibits normal muscle tone.   Skin: Skin is warm and dry. No rash noted.   Psychiatric: Her behavior is normal. Judgment and thought content normal. Her mood appears anxious. She exhibits a depressed mood.   Nursing note and vitals reviewed.      Assessment/Plan   Tahira was seen today for depression and abdominal pain.    Diagnoses and all orders for this visit:    Mild episode of recurrent major depressive disorder    Essential hypertension    Chronic liver failure without hepatic coma    Other orders  -     Discontinue: sertraline (ZOLOFT) 50 MG tablet; Take 2 tablets by mouth Daily.  -     sertraline (ZOLOFT) 50 MG tablet; Take 2 tablets by mouth Daily.

## 2018-02-20 ENCOUNTER — OFFICE VISIT (OUTPATIENT)
Dept: FAMILY MEDICINE CLINIC | Facility: CLINIC | Age: 83
End: 2018-02-20

## 2018-02-20 VITALS
BODY MASS INDEX: 25.95 KG/M2 | TEMPERATURE: 96.1 F | WEIGHT: 141 LBS | SYSTOLIC BLOOD PRESSURE: 140 MMHG | HEART RATE: 72 BPM | DIASTOLIC BLOOD PRESSURE: 60 MMHG | OXYGEN SATURATION: 96 % | HEIGHT: 62 IN

## 2018-02-20 DIAGNOSIS — K72.10 CHRONIC LIVER FAILURE WITHOUT HEPATIC COMA (HCC): ICD-10-CM

## 2018-02-20 DIAGNOSIS — F33.0 MILD EPISODE OF RECURRENT MAJOR DEPRESSIVE DISORDER (HCC): Primary | ICD-10-CM

## 2018-02-20 DIAGNOSIS — I10 ESSENTIAL HYPERTENSION: ICD-10-CM

## 2018-02-20 DIAGNOSIS — E11.9 TYPE 2 DIABETES MELLITUS WITHOUT COMPLICATION, WITHOUT LONG-TERM CURRENT USE OF INSULIN (HCC): ICD-10-CM

## 2018-02-20 PROCEDURE — 99212 OFFICE O/P EST SF 10 MIN: CPT | Performed by: FAMILY MEDICINE

## 2018-02-20 NOTE — PROGRESS NOTES
Subjective   Tahira Morrell is a 83 y.o. female.   Chief Complaint   Patient presents with   • mild episode of recurrent major depressive disorder     4 week fu        Depression   Visit Type: follow-up  Patient presents with the following symptoms: excessive worry, fatigue and nervousness/anxiety.  Patient is not experiencing: anhedonia, depressed mood, insomnia, panic, suicidal ideas, suicidal planning and thoughts of death.  Frequency of symptoms: occasionally   Severity: mild   Sleep quality: fair  Nighttime awakenings: one to two  Compliance with medications:  0-25%  Treatment side effects: nausea.       The following portions of the patient's history were reviewed and updated as appropriate: allergies, current medications, past family history, past medical history, past social history, past surgical history and problem list.    Review of Systems   Constitutional: Positive for activity change and fatigue.   HENT: Negative.    Eyes: Negative.    Respiratory: Negative.    Cardiovascular: Negative.    Gastrointestinal: Positive for abdominal distention, constipation, diarrhea and nausea.   Endocrine: Negative.    Genitourinary: Negative.    Musculoskeletal: Positive for arthralgias and back pain.   Skin: Negative.    Allergic/Immunologic: Negative.    Neurological: Negative.    Hematological: Negative.    Psychiatric/Behavioral: Negative for dysphoric mood and suicidal ideas. The patient is nervous/anxious. The patient does not have insomnia.    All other systems reviewed and are negative.      Objective   Physical Exam   Constitutional: She is oriented to person, place, and time. She appears well-developed and well-nourished.   HENT:   Head: Normocephalic and atraumatic.   Right Ear: External ear normal.   Left Ear: External ear normal.   Nose: Nose normal.   Mouth/Throat: Oropharynx is clear and moist.   Eyes: Conjunctivae and EOM are normal. Pupils are equal, round, and reactive to light.   Neck: Normal  range of motion. Neck supple.   Cardiovascular: Normal rate, regular rhythm, normal heart sounds and intact distal pulses.  Exam reveals no gallop and no friction rub.    No murmur heard.  Pulmonary/Chest: Effort normal and breath sounds normal. She has no wheezes. She has no rales.   Abdominal: Soft. Bowel sounds are normal. She exhibits mass. There is tenderness. There is no rebound and no guarding. A hernia is present.   Musculoskeletal: Normal range of motion.   Neurological: She is alert and oriented to person, place, and time. She has normal reflexes. No cranial nerve deficit. She exhibits normal muscle tone.   Skin: Skin is warm and dry. No rash noted.   Psychiatric: Her behavior is normal. Judgment and thought content normal. Her mood appears anxious. She exhibits a depressed mood.   Nursing note and vitals reviewed.      Assessment/Plan   Tahira was seen today for mild episode of recurrent major depressive disorder.    Diagnoses and all orders for this visit:    Mild episode of recurrent major depressive disorder    Chronic liver failure without hepatic coma    Essential hypertension    Type 2 diabetes mellitus without complication, without long-term current use of insulin        Patient has stopped her Zoloft.  We will hold for any other therapy at this time.  If any reoccurrence occurs we will consider Wellbutrin

## 2018-02-21 DIAGNOSIS — Z79.899 LONG-TERM USE OF HIGH-RISK MEDICATION: ICD-10-CM

## 2018-02-21 DIAGNOSIS — E11.9 TYPE 2 DIABETES MELLITUS WITHOUT COMPLICATION, WITHOUT LONG-TERM CURRENT USE OF INSULIN (HCC): Primary | ICD-10-CM

## 2018-02-27 ENCOUNTER — LAB (OUTPATIENT)
Dept: LAB | Facility: OTHER | Age: 83
End: 2018-02-27

## 2018-02-27 ENCOUNTER — TRANSCRIBE ORDERS (OUTPATIENT)
Dept: GENERAL RADIOLOGY | Facility: CLINIC | Age: 83
End: 2018-02-27

## 2018-02-27 DIAGNOSIS — K74.69 OTHER CIRRHOSIS OF LIVER (HCC): Primary | ICD-10-CM

## 2018-02-27 DIAGNOSIS — K74.69 OTHER CIRRHOSIS OF LIVER (HCC): ICD-10-CM

## 2018-02-27 LAB
ALBUMIN SERPL-MCNC: 3.3 G/DL (ref 3.2–5.5)
ALBUMIN/GLOB SERPL: 0.9 G/DL (ref 1–3)
ALP SERPL-CCNC: 78 U/L (ref 15–121)
ALT SERPL W P-5'-P-CCNC: 16 U/L (ref 10–60)
ANION GAP SERPL CALCULATED.3IONS-SCNC: 9 MMOL/L (ref 5–15)
AST SERPL-CCNC: 33 U/L (ref 10–60)
BASOPHILS # BLD AUTO: 0.02 10*3/MM3 (ref 0–0.2)
BASOPHILS NFR BLD AUTO: 0.5 % (ref 0–2)
BILIRUB SERPL-MCNC: 0.9 MG/DL (ref 0.2–1)
BUN BLD-MCNC: 11 MG/DL (ref 8–25)
BUN/CREAT SERPL: 15.7 (ref 7–25)
CALCIUM SPEC-SCNC: 8.4 MG/DL (ref 8.4–10.8)
CHLORIDE SERPL-SCNC: 104 MMOL/L (ref 100–112)
CO2 SERPL-SCNC: 27 MMOL/L (ref 20–32)
CREAT BLD-MCNC: 0.7 MG/DL (ref 0.4–1.3)
DEPRECATED RDW RBC AUTO: 49.2 FL (ref 36.4–46.3)
EOSINOPHIL # BLD AUTO: 0.12 10*3/MM3 (ref 0–0.7)
EOSINOPHIL NFR BLD AUTO: 2.8 % (ref 0–7)
ERYTHROCYTE [DISTWIDTH] IN BLOOD BY AUTOMATED COUNT: 14.8 % (ref 11.5–14.5)
GFR SERPL CREATININE-BSD FRML MDRD: 80 ML/MIN/1.73 (ref 39–90)
GLOBULIN UR ELPH-MCNC: 3.8 GM/DL (ref 2.5–4.6)
GLUCOSE BLD-MCNC: 104 MG/DL (ref 70–100)
HCT VFR BLD AUTO: 35 % (ref 35–45)
HGB BLD-MCNC: 11.5 G/DL (ref 12–15.5)
LYMPHOCYTES # BLD AUTO: 1.12 10*3/MM3 (ref 0.6–4.2)
LYMPHOCYTES NFR BLD AUTO: 26.2 % (ref 10–50)
MCH RBC QN AUTO: 31.1 PG (ref 26.5–34)
MCHC RBC AUTO-ENTMCNC: 32.9 G/DL (ref 31.4–36)
MCV RBC AUTO: 94.6 FL (ref 80–98)
MONOCYTES # BLD AUTO: 0.57 10*3/MM3 (ref 0–0.9)
MONOCYTES NFR BLD AUTO: 13.3 % (ref 0–12)
NEUTROPHILS # BLD AUTO: 2.44 10*3/MM3 (ref 2–8.6)
NEUTROPHILS NFR BLD AUTO: 57.2 % (ref 37–80)
PLATELET # BLD AUTO: 101 10*3/MM3 (ref 150–450)
PMV BLD AUTO: 10.4 FL (ref 8–12)
POTASSIUM BLD-SCNC: 4 MMOL/L (ref 3.4–5.4)
PROT SERPL-MCNC: 7.1 G/DL (ref 6.7–8.2)
RBC # BLD AUTO: 3.7 10*6/MM3 (ref 3.77–5.16)
SODIUM BLD-SCNC: 140 MMOL/L (ref 134–146)
WBC NRBC COR # BLD: 4.27 10*3/MM3 (ref 3.2–9.8)

## 2018-02-27 PROCEDURE — 80053 COMPREHEN METABOLIC PANEL: CPT | Performed by: INTERNAL MEDICINE

## 2018-02-27 PROCEDURE — 36415 COLL VENOUS BLD VENIPUNCTURE: CPT | Performed by: INTERNAL MEDICINE

## 2018-02-27 PROCEDURE — 85025 COMPLETE CBC W/AUTO DIFF WBC: CPT | Performed by: INTERNAL MEDICINE

## 2018-05-07 LAB
ALBUMIN SERPL-MCNC: 3.3 G/DL (ref 3.2–5.5)
ALBUMIN/GLOB SERPL: 0.8 G/DL (ref 1–3)
ALP SERPL-CCNC: 86 U/L (ref 15–121)
ALT SERPL W P-5'-P-CCNC: 21 U/L (ref 10–60)
ANION GAP SERPL CALCULATED.3IONS-SCNC: 6 MMOL/L (ref 5–15)
AST SERPL-CCNC: 34 U/L (ref 10–60)
BASOPHILS # BLD AUTO: 0.02 10*3/MM3 (ref 0–0.2)
BASOPHILS NFR BLD AUTO: 0.5 % (ref 0–2)
BILIRUB SERPL-MCNC: 0.8 MG/DL (ref 0.2–1)
BUN BLD-MCNC: 15 MG/DL (ref 8–25)
BUN/CREAT SERPL: 16.7 (ref 7–25)
CALCIUM SPEC-SCNC: 8.6 MG/DL (ref 8.4–10.8)
CHLORIDE SERPL-SCNC: 104 MMOL/L (ref 100–112)
CHOLEST SERPL-MCNC: 170 MG/DL (ref 150–200)
CO2 SERPL-SCNC: 29 MMOL/L (ref 20–32)
CREAT BLD-MCNC: 0.9 MG/DL (ref 0.4–1.3)
DEPRECATED RDW RBC AUTO: 49.4 FL (ref 36.4–46.3)
EOSINOPHIL # BLD AUTO: 0.13 10*3/MM3 (ref 0–0.7)
EOSINOPHIL NFR BLD AUTO: 3.2 % (ref 0–7)
ERYTHROCYTE [DISTWIDTH] IN BLOOD BY AUTOMATED COUNT: 14.8 % (ref 11.5–14.5)
GFR SERPL CREATININE-BSD FRML MDRD: 60 ML/MIN/1.73 (ref 39–90)
GLOBULIN UR ELPH-MCNC: 3.9 GM/DL (ref 2.5–4.6)
GLUCOSE BLD-MCNC: 98 MG/DL (ref 70–100)
HBA1C MFR BLD: 5.4 % (ref 4–5.6)
HCT VFR BLD AUTO: 38 % (ref 35–45)
HDLC SERPL-MCNC: 47 MG/DL (ref 35–100)
HGB BLD-MCNC: 12.2 G/DL (ref 12–15.5)
LDLC SERPL CALC-MCNC: 105 MG/DL
LDLC/HDLC SERPL: 2.23 {RATIO}
LYMPHOCYTES # BLD AUTO: 1.28 10*3/MM3 (ref 0.6–4.2)
LYMPHOCYTES NFR BLD AUTO: 31.8 % (ref 10–50)
MCH RBC QN AUTO: 30.7 PG (ref 26.5–34)
MCHC RBC AUTO-ENTMCNC: 32.1 G/DL (ref 31.4–36)
MCV RBC AUTO: 95.5 FL (ref 80–98)
MONOCYTES # BLD AUTO: 0.43 10*3/MM3 (ref 0–0.9)
MONOCYTES NFR BLD AUTO: 10.7 % (ref 0–12)
NEUTROPHILS # BLD AUTO: 2.17 10*3/MM3 (ref 2–8.6)
NEUTROPHILS NFR BLD AUTO: 53.8 % (ref 37–80)
PLATELET # BLD AUTO: 105 10*3/MM3 (ref 150–450)
PMV BLD AUTO: 11 FL (ref 8–12)
POTASSIUM BLD-SCNC: 4.1 MMOL/L (ref 3.4–5.4)
PROT SERPL-MCNC: 7.2 G/DL (ref 6.7–8.2)
RBC # BLD AUTO: 3.98 10*6/MM3 (ref 3.77–5.16)
SODIUM BLD-SCNC: 139 MMOL/L (ref 134–146)
TRIGL SERPL-MCNC: 92 MG/DL (ref 35–160)
TSH SERPL DL<=0.05 MIU/L-ACNC: 6.47 MIU/ML (ref 0.46–4.68)
VLDLC SERPL-MCNC: 18.4 MG/DL
WBC NRBC COR # BLD: 4.03 10*3/MM3 (ref 3.2–9.8)

## 2018-05-07 PROCEDURE — 36415 COLL VENOUS BLD VENIPUNCTURE: CPT | Performed by: FAMILY MEDICINE

## 2018-05-07 PROCEDURE — 85025 COMPLETE CBC W/AUTO DIFF WBC: CPT | Performed by: FAMILY MEDICINE

## 2018-05-07 PROCEDURE — 80061 LIPID PANEL: CPT | Performed by: FAMILY MEDICINE

## 2018-05-07 PROCEDURE — 80053 COMPREHEN METABOLIC PANEL: CPT | Performed by: FAMILY MEDICINE

## 2018-05-07 PROCEDURE — 84443 ASSAY THYROID STIM HORMONE: CPT | Performed by: FAMILY MEDICINE

## 2018-05-07 PROCEDURE — 83036 HEMOGLOBIN GLYCOSYLATED A1C: CPT | Performed by: FAMILY MEDICINE

## 2018-05-18 ENCOUNTER — LAB (OUTPATIENT)
Dept: LAB | Facility: OTHER | Age: 83
End: 2018-05-18

## 2018-05-18 ENCOUNTER — TRANSCRIBE ORDERS (OUTPATIENT)
Dept: GENERAL RADIOLOGY | Facility: CLINIC | Age: 83
End: 2018-05-18

## 2018-05-18 DIAGNOSIS — R50.9 FEVER, UNSPECIFIED FEVER CAUSE: Primary | ICD-10-CM

## 2018-05-18 DIAGNOSIS — R50.9 FEVER, UNSPECIFIED FEVER CAUSE: ICD-10-CM

## 2018-05-18 LAB
ANISOCYTOSIS BLD QL: NORMAL
DEPRECATED RDW RBC AUTO: 49.6 FL (ref 36.4–46.3)
EOSINOPHIL # BLD MANUAL: 0.13 10*3/MM3 (ref 0–0.7)
EOSINOPHIL NFR BLD MANUAL: 3 % (ref 0–7)
ERYTHROCYTE [DISTWIDTH] IN BLOOD BY AUTOMATED COUNT: 15 % (ref 11.5–14.5)
HCT VFR BLD AUTO: 35.8 % (ref 35–45)
HGB BLD-MCNC: 11.7 G/DL (ref 12–15.5)
HYPOCHROMIA BLD QL: NORMAL
LYMPHOCYTES # BLD MANUAL: 1.09 10*3/MM3 (ref 0.6–4.2)
LYMPHOCYTES NFR BLD MANUAL: 10 % (ref 0–12)
LYMPHOCYTES NFR BLD MANUAL: 26 % (ref 10–50)
MCH RBC QN AUTO: 30.6 PG (ref 26.5–34)
MCHC RBC AUTO-ENTMCNC: 32.7 G/DL (ref 31.4–36)
MCV RBC AUTO: 93.7 FL (ref 80–98)
MONOCYTES # BLD AUTO: 0.42 10*3/MM3 (ref 0–0.9)
NEUTROPHILS # BLD AUTO: 2.56 10*3/MM3 (ref 2–8.6)
NEUTROPHILS NFR BLD MANUAL: 59 % (ref 37–80)
NEUTS BAND NFR BLD MANUAL: 2 % (ref 0–5)
PLATELET # BLD AUTO: 88 10*3/MM3 (ref 150–450)
PMV BLD AUTO: 11 FL (ref 8–12)
RBC # BLD AUTO: 3.82 10*6/MM3 (ref 3.77–5.16)
SMALL PLATELETS BLD QL SMEAR: NORMAL
WBC MORPH BLD: NORMAL
WBC NRBC COR # BLD: 4.19 10*3/MM3 (ref 3.2–9.8)

## 2018-05-18 PROCEDURE — 36415 COLL VENOUS BLD VENIPUNCTURE: CPT | Performed by: INTERNAL MEDICINE

## 2018-05-18 PROCEDURE — 85025 COMPLETE CBC W/AUTO DIFF WBC: CPT | Performed by: INTERNAL MEDICINE

## 2018-05-22 ENCOUNTER — LAB (OUTPATIENT)
Dept: LAB | Facility: OTHER | Age: 83
End: 2018-05-22

## 2018-05-22 ENCOUNTER — TRANSCRIBE ORDERS (OUTPATIENT)
Dept: GENERAL RADIOLOGY | Facility: CLINIC | Age: 83
End: 2018-05-22

## 2018-05-22 DIAGNOSIS — R19.7 DIARRHEA, UNSPECIFIED TYPE: ICD-10-CM

## 2018-05-22 DIAGNOSIS — R19.7 DIARRHEA, UNSPECIFIED TYPE: Primary | ICD-10-CM

## 2018-06-04 ENCOUNTER — OFFICE VISIT (OUTPATIENT)
Dept: FAMILY MEDICINE CLINIC | Facility: CLINIC | Age: 83
End: 2018-06-04

## 2018-06-04 VITALS
WEIGHT: 134 LBS | DIASTOLIC BLOOD PRESSURE: 72 MMHG | TEMPERATURE: 97.9 F | SYSTOLIC BLOOD PRESSURE: 138 MMHG | HEART RATE: 70 BPM | HEIGHT: 62 IN | OXYGEN SATURATION: 89 % | BODY MASS INDEX: 24.66 KG/M2

## 2018-06-04 DIAGNOSIS — E53.8 B12 DEFICIENCY: ICD-10-CM

## 2018-06-04 DIAGNOSIS — E03.9 ACQUIRED HYPOTHYROIDISM: ICD-10-CM

## 2018-06-04 DIAGNOSIS — K72.10 CHRONIC LIVER FAILURE WITHOUT HEPATIC COMA (HCC): ICD-10-CM

## 2018-06-04 DIAGNOSIS — I10 ESSENTIAL HYPERTENSION: Primary | ICD-10-CM

## 2018-06-04 DIAGNOSIS — E11.9 TYPE 2 DIABETES MELLITUS WITHOUT COMPLICATION, WITHOUT LONG-TERM CURRENT USE OF INSULIN (HCC): ICD-10-CM

## 2018-06-04 PROCEDURE — 99214 OFFICE O/P EST MOD 30 MIN: CPT | Performed by: FAMILY MEDICINE

## 2018-06-04 RX ORDER — TRAMADOL HYDROCHLORIDE 50 MG/1
TABLET ORAL
COMMUNITY
Start: 2018-05-21 | End: 2018-06-27 | Stop reason: SDUPTHER

## 2018-06-04 RX ORDER — FUROSEMIDE 20 MG/1
20 TABLET ORAL DAILY
COMMUNITY
Start: 2018-04-05 | End: 2018-06-27 | Stop reason: SDUPTHER

## 2018-06-04 RX ORDER — LEVOTHYROXINE SODIUM 0.05 MG/1
50 TABLET ORAL DAILY
Qty: 30 TABLET | Refills: 6 | Status: SHIPPED | OUTPATIENT
Start: 2018-06-04

## 2018-06-04 NOTE — PROGRESS NOTES
Subjective   Tahira Morrell is a 84 y.o. female who presents to the office for follow-up and review of labs.     History of Present Illness   Patient with multiple medical problems including hypertension, type 2 diabetes mellitus, and chronic cirrhosis is in today for reevaluation and review labs.  Since of seen the patient she was recently hospitalized with colitis.  She has completed antibiotics.  Patient complains of fatigue, constipation alternating with diarrhea, weight gain.  Patient denies chest pain, PND, orthopnea.  He has been complaining of some numbness in her bilateral lower extremities as well.    The following portions of the patient's history were reviewed and updated as appropriate: allergies, current medications, past family history, past medical history, past social history, past surgical history and problem list.    Review of Systems   Constitutional: Positive for appetite change and fever.   HENT: Negative.    Eyes: Negative.    Respiratory: Negative.    Cardiovascular: Negative.    Gastrointestinal: Positive for abdominal pain, constipation and diarrhea.   Endocrine: Negative.    Genitourinary: Negative.    Musculoskeletal: Positive for arthralgias.   Skin: Negative.    Allergic/Immunologic: Negative.    Neurological: Positive for weakness and numbness.   Hematological: Negative.    Psychiatric/Behavioral: The patient is nervous/anxious.    All other systems reviewed and are negative.      Objective   Physical Exam   Constitutional: She is oriented to person, place, and time. She appears well-developed and well-nourished.   HENT:   Head: Normocephalic and atraumatic.   Right Ear: External ear normal.   Left Ear: External ear normal.   Nose: Nose normal.   Mouth/Throat: Oropharynx is clear and moist.   Eyes: Conjunctivae and EOM are normal. Pupils are equal, round, and reactive to light.   Neck: Normal range of motion. Neck supple.   Cardiovascular: Normal rate, regular rhythm, normal heart  sounds and intact distal pulses.  Exam reveals no gallop and no friction rub.    No murmur heard.  Pulmonary/Chest: Effort normal and breath sounds normal. She has no wheezes. She has no rales.   Abdominal: Soft. Bowel sounds are normal. She exhibits mass. There is tenderness. There is no rebound and no guarding. A hernia is present.   Musculoskeletal: Normal range of motion.   Neurological: She is alert and oriented to person, place, and time. She has normal reflexes. No cranial nerve deficit. She exhibits normal muscle tone.   Skin: Skin is warm and dry. No rash noted.   Psychiatric: Her behavior is normal. Judgment and thought content normal. Her mood appears anxious. She exhibits a depressed mood.   Nursing note and vitals reviewed.      Assessment/Plan   Tahira was seen today for hypertension.    Diagnoses and all orders for this visit:    Essential hypertension    Chronic liver failure without hepatic coma    B12 deficiency    Type 2 diabetes mellitus without complication, without long-term current use of insulin    Acquired hypothyroidism  -     TSH; Future  -     T4, Free; Future    Other orders  -     levothyroxine (SYNTHROID) 50 MCG tablet; Take 1 tablet by mouth Daily.    Continue other therapies.     Labs are reviewed with patient.    PHQ-2/PHQ-9 Depression Screening 11/14/2017   Little interest or pleasure in doing things 3   Feeling down, depressed, or hopeless 3   Trouble falling or staying asleep, or sleeping too much 3   Feeling tired or having little energy 3   Poor appetite or overeating 3   Feeling bad about yourself - or that you are a failure or have let yourself or your family down 3   Trouble concentrating on things, such as reading the newspaper or watching television 3   Moving or speaking so slowly that other people could have noticed. Or the opposite - being so fidgety or restless that you have been moving around a lot more than usual 3   Thoughts that you would be better off dead, or of  hurting yourself in some way 3   Total Score 27   If you checked off any problems, how difficult have these problems made it for you to do your work, take care of things at home, or get along with other people? Extremely dIfficult         Lab on 05/18/2018   Component Date Value Ref Range Status   • WBC 05/18/2018 4.19  3.20 - 9.80 10*3/mm3 Final   • RBC 05/18/2018 3.82  3.77 - 5.16 10*6/mm3 Final   • Hemoglobin 05/18/2018 11.7* 12.0 - 15.5 g/dL Final   • Hematocrit 05/18/2018 35.8  35.0 - 45.0 % Final   • MCV 05/18/2018 93.7  80.0 - 98.0 fL Final   • MCH 05/18/2018 30.6  26.5 - 34.0 pg Final   • MCHC 05/18/2018 32.7  31.4 - 36.0 g/dL Final   • RDW 05/18/2018 15.0* 11.5 - 14.5 % Final   • RDW-SD 05/18/2018 49.6* 36.4 - 46.3 fl Final   • MPV 05/18/2018 11.0  8.0 - 12.0 fL Final   • Platelets 05/18/2018 88* 150 - 450 10*3/mm3 Final   • Neutrophil % 05/18/2018 59.0  37.0 - 80.0 % Final   • Lymphocyte % 05/18/2018 26.0  10.0 - 50.0 % Final   • Monocyte % 05/18/2018 10.0  0.0 - 12.0 % Final   • Eosinophil % 05/18/2018 3.0  0.0 - 7.0 % Final   • Bands %  05/18/2018 2.0  0.0 - 5.0 % Final   • Neutrophils Absolute 05/18/2018 2.56  2.00 - 8.60 10*3/mm3 Final   • Lymphocytes Absolute 05/18/2018 1.09  0.60 - 4.20 10*3/mm3 Final   • Monocytes Absolute 05/18/2018 0.42  0.00 - 0.90 10*3/mm3 Final   • Eosinophils Absolute 05/18/2018 0.13  0.00 - 0.70 10*3/mm3 Final   • Anisocytosis 05/18/2018 Slight/1+  None Seen Final   • Hypochromia 05/18/2018 Slight/1+  None Seen Final   • WBC Morphology 05/18/2018 Normal  Normal Final   • Platelet Estimate 05/18/2018 Decreased  Normal Final   Lab on 02/27/2018   Component Date Value Ref Range Status   • Glucose 02/27/2018 104* 70 - 100 mg/dL Final   • BUN 02/27/2018 11  8 - 25 mg/dL Final   • Creatinine 02/27/2018 0.70  0.40 - 1.30 mg/dL Final   • Sodium 02/27/2018 140  134 - 146 mmol/L Final   • Potassium 02/27/2018 4.0  3.4 - 5.4 mmol/L Final   • Chloride 02/27/2018 104  100 - 112 mmol/L  Final   • CO2 02/27/2018 27.0  20.0 - 32.0 mmol/L Final   • Calcium 02/27/2018 8.4  8.4 - 10.8 mg/dL Final   • Total Protein 02/27/2018 7.1  6.7 - 8.2 g/dL Final   • Albumin 02/27/2018 3.30  3.20 - 5.50 g/dL Final   • ALT (SGPT) 02/27/2018 16  10 - 60 U/L Final   • AST (SGOT) 02/27/2018 33  10 - 60 U/L Final   • Alkaline Phosphatase 02/27/2018 78  15 - 121 U/L Final   • Total Bilirubin 02/27/2018 0.9  0.2 - 1.0 mg/dL Final   • eGFR Non African Amer 02/27/2018 80  39 - 90 mL/min/1.73 Final   • Globulin 02/27/2018 3.8  2.5 - 4.6 gm/dL Final   • A/G Ratio 02/27/2018 0.9* 1.0 - 3.0 g/dL Final   • BUN/Creatinine Ratio 02/27/2018 15.7  7.0 - 25.0 Final   • Anion Gap 02/27/2018 9.0  5.0 - 15.0 mmol/L Final   • WBC 02/27/2018 4.27  3.20 - 9.80 10*3/mm3 Final   • RBC 02/27/2018 3.70* 3.77 - 5.16 10*6/mm3 Final   • Hemoglobin 02/27/2018 11.5* 12.0 - 15.5 g/dL Final   • Hematocrit 02/27/2018 35.0  35.0 - 45.0 % Final   • MCV 02/27/2018 94.6  80.0 - 98.0 fL Final   • MCH 02/27/2018 31.1  26.5 - 34.0 pg Final   • MCHC 02/27/2018 32.9  31.4 - 36.0 g/dL Final   • RDW 02/27/2018 14.8* 11.5 - 14.5 % Final   • RDW-SD 02/27/2018 49.2* 36.4 - 46.3 fl Final   • MPV 02/27/2018 10.4  8.0 - 12.0 fL Final   • Platelets 02/27/2018 101* 150 - 450 10*3/mm3 Final   • Neutrophil % 02/27/2018 57.2  37.0 - 80.0 % Final   • Lymphocyte % 02/27/2018 26.2  10.0 - 50.0 % Final   • Monocyte % 02/27/2018 13.3* 0.0 - 12.0 % Final   • Eosinophil % 02/27/2018 2.8  0.0 - 7.0 % Final   • Basophil % 02/27/2018 0.5  0.0 - 2.0 % Final   • Neutrophils, Absolute 02/27/2018 2.44  2.00 - 8.60 10*3/mm3 Final   • Lymphocytes, Absolute 02/27/2018 1.12  0.60 - 4.20 10*3/mm3 Final   • Monocytes, Absolute 02/27/2018 0.57  0.00 - 0.90 10*3/mm3 Final   • Eosinophils, Absolute 02/27/2018 0.12  0.00 - 0.70 10*3/mm3 Final   • Basophils, Absolute 02/27/2018 0.02  0.00 - 0.20 10*3/mm3 Final   Orders Only on 02/21/2018   Component Date Value Ref Range Status   • Glucose  05/07/2018 98  70 - 100 mg/dL Final   • BUN 05/07/2018 15  8 - 25 mg/dL Final   • Creatinine 05/07/2018 0.90  0.40 - 1.30 mg/dL Final   • Sodium 05/07/2018 139  134 - 146 mmol/L Final   • Potassium 05/07/2018 4.1  3.4 - 5.4 mmol/L Final   • Chloride 05/07/2018 104  100 - 112 mmol/L Final   • CO2 05/07/2018 29.0  20.0 - 32.0 mmol/L Final   • Calcium 05/07/2018 8.6  8.4 - 10.8 mg/dL Final   • Total Protein 05/07/2018 7.2  6.7 - 8.2 g/dL Final   • Albumin 05/07/2018 3.30  3.20 - 5.50 g/dL Final   • ALT (SGPT) 05/07/2018 21  10 - 60 U/L Final   • AST (SGOT) 05/07/2018 34  10 - 60 U/L Final   • Alkaline Phosphatase 05/07/2018 86  15 - 121 U/L Final   • Total Bilirubin 05/07/2018 0.8  0.2 - 1.0 mg/dL Final   • eGFR Non African Amer 05/07/2018 60  39 - 90 mL/min/1.73 Final   • Globulin 05/07/2018 3.9  2.5 - 4.6 gm/dL Final   • A/G Ratio 05/07/2018 0.8* 1.0 - 3.0 g/dL Final   • BUN/Creatinine Ratio 05/07/2018 16.7  7.0 - 25.0 Final   • Anion Gap 05/07/2018 6.0  5.0 - 15.0 mmol/L Final   • Total Cholesterol 05/07/2018 170  150 - 200 mg/dL Final   • Triglycerides 05/07/2018 92  35 - 160 mg/dL Final   • HDL Cholesterol 05/07/2018 47  35 - 100 mg/dL Final   • LDL Cholesterol  05/07/2018 105  mg/dL Final   • VLDL Cholesterol 05/07/2018 18.4  mg/dL Final   • LDL/HDL Ratio 05/07/2018 2.23   Final   • TSH 05/07/2018 6.470* 0.460 - 4.680 mIU/mL Final   • Hemoglobin A1C 05/07/2018 5.4  4 - 5.6 % Final   • WBC 05/07/2018 4.03  3.20 - 9.80 10*3/mm3 Final   • RBC 05/07/2018 3.98  3.77 - 5.16 10*6/mm3 Final   • Hemoglobin 05/07/2018 12.2  12.0 - 15.5 g/dL Final   • Hematocrit 05/07/2018 38.0  35.0 - 45.0 % Final   • MCV 05/07/2018 95.5  80.0 - 98.0 fL Final   • MCH 05/07/2018 30.7  26.5 - 34.0 pg Final   • MCHC 05/07/2018 32.1  31.4 - 36.0 g/dL Final   • RDW 05/07/2018 14.8* 11.5 - 14.5 % Final   • RDW-SD 05/07/2018 49.4* 36.4 - 46.3 fl Final   • MPV 05/07/2018 11.0  8.0 - 12.0 fL Final   • Platelets 05/07/2018 105* 150 - 450 10*3/mm3  Final   • Neutrophil % 05/07/2018 53.8  37.0 - 80.0 % Final   • Lymphocyte % 05/07/2018 31.8  10.0 - 50.0 % Final   • Monocyte % 05/07/2018 10.7  0.0 - 12.0 % Final   • Eosinophil % 05/07/2018 3.2  0.0 - 7.0 % Final   • Basophil % 05/07/2018 0.5  0.0 - 2.0 % Final   • Neutrophils, Absolute 05/07/2018 2.17  2.00 - 8.60 10*3/mm3 Final   • Lymphocytes, Absolute 05/07/2018 1.28  0.60 - 4.20 10*3/mm3 Final   • Monocytes, Absolute 05/07/2018 0.43  0.00 - 0.90 10*3/mm3 Final   • Eosinophils, Absolute 05/07/2018 0.13  0.00 - 0.70 10*3/mm3 Final   • Basophils, Absolute 05/07/2018 0.02  0.00 - 0.20 10*3/mm3 Final   ]

## 2018-06-27 ENCOUNTER — OFFICE VISIT (OUTPATIENT)
Dept: FAMILY MEDICINE CLINIC | Facility: CLINIC | Age: 83
End: 2018-06-27

## 2018-06-27 VITALS
BODY MASS INDEX: 22.89 KG/M2 | WEIGHT: 124.4 LBS | HEART RATE: 73 BPM | OXYGEN SATURATION: 95 % | RESPIRATION RATE: 14 BRPM | TEMPERATURE: 97 F | HEIGHT: 62 IN | SYSTOLIC BLOOD PRESSURE: 138 MMHG | DIASTOLIC BLOOD PRESSURE: 70 MMHG

## 2018-06-27 DIAGNOSIS — I10 ESSENTIAL HYPERTENSION: ICD-10-CM

## 2018-06-27 DIAGNOSIS — K72.10 CHRONIC LIVER FAILURE WITHOUT HEPATIC COMA (HCC): ICD-10-CM

## 2018-06-27 DIAGNOSIS — M15.9 GENERALIZED OA: Primary | ICD-10-CM

## 2018-06-27 PROCEDURE — 99213 OFFICE O/P EST LOW 20 MIN: CPT | Performed by: FAMILY MEDICINE

## 2018-06-27 RX ORDER — FUROSEMIDE 20 MG/1
20 TABLET ORAL DAILY
Qty: 30 TABLET | Refills: 3 | Status: SHIPPED | OUTPATIENT
Start: 2018-06-27

## 2018-06-27 RX ORDER — TRAMADOL HYDROCHLORIDE 50 MG/1
50 TABLET ORAL EVERY 6 HOURS PRN
Qty: 60 TABLET | Refills: 2 | Status: SHIPPED | OUTPATIENT
Start: 2018-06-27

## 2018-06-27 NOTE — PROGRESS NOTES
Subjective   Tahira Morrell is a 84 y.o. female.   Chief Complaint   Patient presents with   • Osteoarthritis     3 mo control on Tramadol       Arthritis   Presents for follow-up visit. She complains of pain and stiffness. The symptoms have been worsening. Associated symptoms include diarrhea and fever.        The following portions of the patient's history were reviewed and updated as appropriate: allergies, current medications, past family history, past medical history, past social history, past surgical history and problem list.    Review of Systems   Constitutional: Positive for appetite change and fever.   HENT: Negative.    Eyes: Negative.    Respiratory: Negative.    Cardiovascular: Negative.    Gastrointestinal: Positive for abdominal pain, constipation and diarrhea.   Endocrine: Negative.    Genitourinary: Negative.    Musculoskeletal: Positive for arthralgias, arthritis and stiffness.   Skin: Negative.    Allergic/Immunologic: Negative.    Neurological: Positive for weakness and numbness.   Hematological: Negative.    Psychiatric/Behavioral: The patient is nervous/anxious.    All other systems reviewed and are negative.      Objective   Physical Exam   Constitutional: She is oriented to person, place, and time. She appears well-developed and well-nourished.   HENT:   Head: Normocephalic and atraumatic.   Right Ear: External ear normal.   Left Ear: External ear normal.   Nose: Nose normal.   Mouth/Throat: Oropharynx is clear and moist.   Eyes: Conjunctivae and EOM are normal. Pupils are equal, round, and reactive to light.   Neck: Normal range of motion. Neck supple.   Cardiovascular: Normal rate, regular rhythm, normal heart sounds and intact distal pulses.  Exam reveals no gallop and no friction rub.    No murmur heard.  Pulmonary/Chest: Effort normal and breath sounds normal. She has no wheezes. She has no rales.   Abdominal: Soft. Bowel sounds are normal. She exhibits mass. There is tenderness.  There is no rebound and no guarding. A hernia is present.   Musculoskeletal: Normal range of motion.   Neurological: She is alert and oriented to person, place, and time. She has normal reflexes. No cranial nerve deficit. She exhibits normal muscle tone.   Skin: Skin is warm and dry. No rash noted.   Psychiatric: Her behavior is normal. Judgment and thought content normal. Her mood appears anxious. She exhibits a depressed mood.   Nursing note and vitals reviewed.  Julio reviewed, contract signed    Assessment/Plan   Tahira was seen today for osteoarthritis.    Diagnoses and all orders for this visit:    Generalized OA    Chronic liver failure without hepatic coma    Essential hypertension    Other orders  -     traMADol (ULTRAM) 50 MG tablet; Take 1 tablet by mouth Every 6 (Six) Hours As Needed for Moderate Pain .

## 2018-07-09 ENCOUNTER — LAB (OUTPATIENT)
Dept: LAB | Facility: OTHER | Age: 83
End: 2018-07-09

## 2018-07-09 ENCOUNTER — TRANSCRIBE ORDERS (OUTPATIENT)
Dept: GENERAL RADIOLOGY | Facility: CLINIC | Age: 83
End: 2018-07-09

## 2018-07-09 DIAGNOSIS — K74.60 CIRRHOSIS OF LIVER WITHOUT ASCITES, UNSPECIFIED HEPATIC CIRRHOSIS TYPE (HCC): Primary | ICD-10-CM

## 2018-07-09 DIAGNOSIS — K74.60 CIRRHOSIS OF LIVER WITHOUT ASCITES, UNSPECIFIED HEPATIC CIRRHOSIS TYPE (HCC): ICD-10-CM

## 2018-07-09 LAB
ALBUMIN SERPL-MCNC: 4 G/DL (ref 3.5–5)
ALBUMIN/GLOB SERPL: 1 G/DL (ref 1.1–1.8)
ALP SERPL-CCNC: 97 U/L (ref 38–126)
ALT SERPL W P-5'-P-CCNC: 19 U/L
AMMONIA BLD-SCNC: 57 UMOL/L (ref 9–30)
ANION GAP SERPL CALCULATED.3IONS-SCNC: 10 MMOL/L (ref 5–15)
ANISOCYTOSIS BLD QL: NORMAL
AST SERPL-CCNC: 31 U/L (ref 14–36)
BILIRUB SERPL-MCNC: 0.8 MG/DL (ref 0.2–1.3)
BUN BLD-MCNC: 13 MG/DL (ref 7–17)
BUN/CREAT SERPL: 17.8 (ref 7–25)
CALCIUM SPEC-SCNC: 8.9 MG/DL (ref 8.4–10.2)
CHLORIDE SERPL-SCNC: 106 MMOL/L (ref 98–107)
CO2 SERPL-SCNC: 26 MMOL/L (ref 22–30)
CREAT BLD-MCNC: 0.73 MG/DL (ref 0.52–1.04)
DEPRECATED RDW RBC AUTO: 49.7 FL (ref 36.4–46.3)
EOSINOPHIL # BLD MANUAL: 0.07 10*3/MM3 (ref 0–0.7)
EOSINOPHIL NFR BLD MANUAL: 2 % (ref 0–7)
ERYTHROCYTE [DISTWIDTH] IN BLOOD BY AUTOMATED COUNT: 14.9 % (ref 11.5–14.5)
GFR SERPL CREATININE-BSD FRML MDRD: 76 ML/MIN/1.73 (ref 39–90)
GLOBULIN UR ELPH-MCNC: 4.1 GM/DL (ref 2.3–3.5)
GLUCOSE BLD-MCNC: 139 MG/DL (ref 74–99)
HCT VFR BLD AUTO: 38.3 % (ref 35–45)
HGB BLD-MCNC: 12.2 G/DL (ref 12–15.5)
HYPOCHROMIA BLD QL: NORMAL
INR PPP: 1.11 (ref 0.8–1.2)
LYMPHOCYTES # BLD MANUAL: 1.12 10*3/MM3 (ref 0.6–4.2)
LYMPHOCYTES NFR BLD MANUAL: 30 % (ref 10–50)
LYMPHOCYTES NFR BLD MANUAL: 8 % (ref 0–12)
MCH RBC QN AUTO: 29.9 PG (ref 26.5–34)
MCHC RBC AUTO-ENTMCNC: 31.9 G/DL (ref 31.4–36)
MCV RBC AUTO: 93.9 FL (ref 80–98)
MONOCYTES # BLD AUTO: 0.3 10*3/MM3 (ref 0–0.9)
NEUTROPHILS # BLD AUTO: 2.24 10*3/MM3 (ref 2–8.6)
NEUTROPHILS NFR BLD MANUAL: 60 % (ref 37–80)
PLATELET # BLD AUTO: 96 10*3/MM3 (ref 150–450)
PMV BLD AUTO: 11 FL (ref 8–12)
POTASSIUM BLD-SCNC: 4.2 MMOL/L (ref 3.4–5)
PROT SERPL-MCNC: 8.1 G/DL (ref 6.3–8.2)
PROTHROMBIN TIME: 14 SECONDS (ref 11.1–15.3)
RBC # BLD AUTO: 4.08 10*6/MM3 (ref 3.77–5.16)
SMALL PLATELETS BLD QL SMEAR: NORMAL
SODIUM BLD-SCNC: 142 MMOL/L (ref 137–145)
WBC MORPH BLD: NORMAL
WBC NRBC COR # BLD: 3.74 10*3/MM3 (ref 3.2–9.8)

## 2018-07-09 PROCEDURE — 85025 COMPLETE CBC W/AUTO DIFF WBC: CPT | Performed by: INTERNAL MEDICINE

## 2018-07-09 PROCEDURE — 85610 PROTHROMBIN TIME: CPT | Performed by: INTERNAL MEDICINE

## 2018-07-09 PROCEDURE — 36415 COLL VENOUS BLD VENIPUNCTURE: CPT | Performed by: INTERNAL MEDICINE

## 2018-07-09 PROCEDURE — 82140 ASSAY OF AMMONIA: CPT | Performed by: INTERNAL MEDICINE

## 2018-07-09 PROCEDURE — 80053 COMPREHEN METABOLIC PANEL: CPT | Performed by: INTERNAL MEDICINE

## 2018-08-01 RX ORDER — SUCRALFATE 1 G/1
TABLET ORAL
Qty: 120 TABLET | Refills: 5 | Status: SHIPPED | OUTPATIENT
Start: 2018-08-01

## 2018-08-28 ENCOUNTER — LAB (OUTPATIENT)
Dept: LAB | Facility: OTHER | Age: 83
End: 2018-08-28

## 2018-08-28 DIAGNOSIS — E03.9 ACQUIRED HYPOTHYROIDISM: ICD-10-CM

## 2018-08-28 LAB
T4 FREE SERPL-MCNC: 0.84 NG/DL (ref 0.78–2.19)
TSH SERPL DL<=0.05 MIU/L-ACNC: 4.74 MIU/ML (ref 0.46–4.68)

## 2018-08-28 PROCEDURE — 84439 ASSAY OF FREE THYROXINE: CPT | Performed by: FAMILY MEDICINE

## 2018-08-28 PROCEDURE — 36415 COLL VENOUS BLD VENIPUNCTURE: CPT | Performed by: FAMILY MEDICINE

## 2018-08-28 PROCEDURE — 84443 ASSAY THYROID STIM HORMONE: CPT | Performed by: FAMILY MEDICINE

## 2018-09-04 ENCOUNTER — OFFICE VISIT (OUTPATIENT)
Dept: FAMILY MEDICINE CLINIC | Facility: CLINIC | Age: 83
End: 2018-09-04

## 2018-09-04 VITALS
TEMPERATURE: 98.1 F | SYSTOLIC BLOOD PRESSURE: 120 MMHG | HEART RATE: 66 BPM | DIASTOLIC BLOOD PRESSURE: 60 MMHG | OXYGEN SATURATION: 94 % | BODY MASS INDEX: 25.03 KG/M2 | HEIGHT: 62 IN | WEIGHT: 136 LBS

## 2018-09-04 DIAGNOSIS — I10 ESSENTIAL HYPERTENSION: ICD-10-CM

## 2018-09-04 DIAGNOSIS — K72.10 CHRONIC LIVER FAILURE WITHOUT HEPATIC COMA (HCC): ICD-10-CM

## 2018-09-04 DIAGNOSIS — M15.9 GENERALIZED OA: Primary | ICD-10-CM

## 2018-09-04 DIAGNOSIS — E03.9 ACQUIRED HYPOTHYROIDISM: ICD-10-CM

## 2018-09-04 DIAGNOSIS — D69.6 THROMBOCYTOPENIA (HCC): ICD-10-CM

## 2018-09-04 DIAGNOSIS — E11.9 TYPE 2 DIABETES MELLITUS WITHOUT COMPLICATION, WITHOUT LONG-TERM CURRENT USE OF INSULIN (HCC): ICD-10-CM

## 2018-09-04 PROCEDURE — 99213 OFFICE O/P EST LOW 20 MIN: CPT | Performed by: FAMILY MEDICINE

## 2018-09-04 NOTE — PROGRESS NOTES
Subjective   Tahira Morrell is a 84 y.o. female.   Chief Complaint   Patient presents with   • Hypertension     3 mo F/U       Arthritis   Presents for follow-up visit. She complains of pain and stiffness. The symptoms have been improving. Affected locations include the right shoulder, left shoulder, left knee, right knee, left hip and right hip. Associated symptoms include diarrhea, fatigue, fever and pain at night. Compliance with medications is %. Treatment side effects: none.   Hypertension   This is a chronic problem. The current episode started more than 1 year ago. The problem is unchanged. The problem is controlled. Associated symptoms include shortness of breath. Pertinent negatives include no blurred vision, chest pain, headaches or PND.   Hypothyroidism   This is a chronic problem. The current episode started more than 1 year ago. The problem has been gradually worsening. Associated symptoms include abdominal pain, arthralgias, fatigue, a fever, numbness and weakness. Pertinent negatives include no chest pain or headaches. Treatments tried: synthroid. The treatment provided no relief.        The following portions of the patient's history were reviewed and updated as appropriate: allergies, current medications, past family history, past medical history, past social history, past surgical history and problem list.    Review of Systems   Constitutional: Positive for appetite change, fatigue and fever.   HENT: Negative.    Eyes: Negative.  Negative for blurred vision.   Respiratory: Positive for shortness of breath.    Cardiovascular: Negative.  Negative for chest pain and PND.   Gastrointestinal: Positive for abdominal pain, constipation and diarrhea.   Endocrine: Negative.    Genitourinary: Negative.    Musculoskeletal: Positive for arthralgias, arthritis and stiffness.   Skin: Negative.    Allergic/Immunologic: Negative.    Neurological: Positive for weakness and numbness. Negative for headaches.    Hematological: Negative.    Psychiatric/Behavioral: The patient is nervous/anxious.    All other systems reviewed and are negative.      Objective   Physical Exam   Constitutional: She is oriented to person, place, and time. She appears well-developed and well-nourished.   HENT:   Head: Normocephalic and atraumatic.   Right Ear: External ear normal.   Left Ear: External ear normal.   Nose: Nose normal.   Mouth/Throat: Oropharynx is clear and moist.   Eyes: Pupils are equal, round, and reactive to light. Conjunctivae and EOM are normal.   Neck: Normal range of motion. Neck supple.   Cardiovascular: Normal rate, regular rhythm, normal heart sounds and intact distal pulses.  Exam reveals no gallop and no friction rub.    No murmur heard.  Pulmonary/Chest: Effort normal and breath sounds normal. She has no wheezes. She has no rales.   Abdominal: Soft. Bowel sounds are normal. She exhibits mass. There is tenderness. There is no rebound and no guarding. A hernia is present.   Musculoskeletal: Normal range of motion.   Neurological: She is alert and oriented to person, place, and time. She has normal reflexes. No cranial nerve deficit. She exhibits normal muscle tone.   Skin: Skin is warm and dry. No rash noted.   Psychiatric: Her behavior is normal. Judgment and thought content normal. Her mood appears anxious. She exhibits a depressed mood.   Nursing note and vitals reviewed.  Labs reviewed    Assessment/Plan   Tahira was seen today for hypertension.    Diagnoses and all orders for this visit:    Generalized OA    Chronic liver failure without hepatic coma (CMS/HCC)    Essential hypertension    Thrombocytopenia (CMS/HCC)  -     CBC & Differential; Future    Acquired hypothyroidism  -     TSH; Future  -     T4, Free; Future    Type 2 diabetes mellitus without complication, without long-term current use of insulin (CMS/HCC)  -     Comprehensive Metabolic Panel; Future  -     Hemoglobin A1c; Future  -     LDL Cholesterol,  Direct; Future

## 2018-09-25 ENCOUNTER — OFFICE VISIT (OUTPATIENT)
Dept: FAMILY MEDICINE CLINIC | Facility: CLINIC | Age: 83
End: 2018-09-25

## 2018-09-25 VITALS
SYSTOLIC BLOOD PRESSURE: 130 MMHG | TEMPERATURE: 98 F | BODY MASS INDEX: 25.51 KG/M2 | HEART RATE: 81 BPM | HEIGHT: 62 IN | DIASTOLIC BLOOD PRESSURE: 60 MMHG | OXYGEN SATURATION: 94 % | WEIGHT: 138.6 LBS

## 2018-09-25 DIAGNOSIS — D69.6 THROMBOCYTOPENIA (HCC): ICD-10-CM

## 2018-09-25 DIAGNOSIS — I10 ESSENTIAL HYPERTENSION: ICD-10-CM

## 2018-09-25 DIAGNOSIS — K72.10 CHRONIC LIVER FAILURE WITHOUT HEPATIC COMA (HCC): ICD-10-CM

## 2018-09-25 DIAGNOSIS — K52.9 COLITIS: Primary | ICD-10-CM

## 2018-09-25 PROCEDURE — 99214 OFFICE O/P EST MOD 30 MIN: CPT | Performed by: FAMILY MEDICINE

## 2018-09-25 RX ORDER — CIPROFLOXACIN 500 MG/1
500 TABLET, FILM COATED ORAL EVERY 12 HOURS SCHEDULED
Qty: 20 TABLET | Refills: 0 | Status: SHIPPED | OUTPATIENT
Start: 2018-09-25

## 2018-09-25 NOTE — PROGRESS NOTES
Subjective   Tahira Morrell is a 84 y.o. female.   Chief Complaint   Patient presents with   • Possible colon infection     passing a lot of mucus and pain Rt side low abdomen.       Abdominal Pain   This is a recurrent problem. The current episode started in the past 7 days. The onset quality is gradual. The problem has been gradually worsening. The pain is located in the RLQ and suprapubic region. The pain is at a severity of 5/10. The pain is moderate. The quality of the pain is aching and cramping. The abdominal pain does not radiate. Associated symptoms include arthralgias, constipation, diarrhea and nausea. Pertinent negatives include no anorexia, dysuria, fever, frequency, headaches, hematuria or vomiting. colitis and liver failure      Additional history from son and old records have been reviewed.    The following portions of the patient's history were reviewed and updated as appropriate: allergies, current medications, past family history, past medical history, past social history, past surgical history and problem list.    Review of Systems   Constitutional: Positive for appetite change and fatigue. Negative for fever.   HENT: Negative.    Eyes: Negative.    Respiratory: Positive for shortness of breath.    Cardiovascular: Negative.  Negative for chest pain.   Gastrointestinal: Positive for abdominal pain, constipation, diarrhea and nausea. Negative for anorexia and vomiting.   Endocrine: Negative.    Genitourinary: Negative.  Negative for dysuria, frequency and hematuria.   Musculoskeletal: Positive for arthralgias.   Skin: Negative.    Allergic/Immunologic: Negative.    Neurological: Positive for weakness and numbness. Negative for headaches.   Hematological: Negative.    Psychiatric/Behavioral: The patient is nervous/anxious.    All other systems reviewed and are negative.      Objective   Physical Exam   Constitutional: She is oriented to person, place, and time. She appears well-developed and  well-nourished.   HENT:   Head: Normocephalic and atraumatic.   Right Ear: External ear normal.   Left Ear: External ear normal.   Nose: Nose normal.   Mouth/Throat: Oropharynx is clear and moist.   Eyes: Pupils are equal, round, and reactive to light. Conjunctivae and EOM are normal.   Neck: Normal range of motion. Neck supple.   Cardiovascular: Normal rate, regular rhythm, normal heart sounds and intact distal pulses.  Exam reveals no gallop and no friction rub.    No murmur heard.  Pulmonary/Chest: Effort normal and breath sounds normal. She has no wheezes. She has no rales.   Abdominal: Soft. Bowel sounds are normal. She exhibits mass. There is tenderness. There is no rebound and no guarding. A hernia is present.   Musculoskeletal: Normal range of motion.   Neurological: She is alert and oriented to person, place, and time. She has normal reflexes. No cranial nerve deficit. She exhibits normal muscle tone.   Skin: Skin is warm and dry. No rash noted.   Psychiatric: Her behavior is normal. Judgment and thought content normal. Her mood appears anxious. She exhibits a depressed mood.   Nursing note and vitals reviewed.      Assessment/Plan   Tahira was seen today for possible colon infection.    Diagnoses and all orders for this visit:    Colitis    Chronic liver failure without hepatic coma (CMS/HCC)    Essential hypertension    Thrombocytopenia (CMS/HCC)    Other orders  -     ciprofloxacin (CIPRO) 500 MG tablet; Take 1 tablet by mouth Every 12 (Twelve) Hours.        Patient would like to avoid inpatient admission.  I have recommended that if she begins to run fever greater than 100.4, begins to have nausea or vomiting, or does not improve 72 hours that she go directly to the emergency department.

## 2018-10-15 RX ORDER — HYDROXYZINE HYDROCHLORIDE 25 MG/1
TABLET, FILM COATED ORAL
Qty: 60 TABLET | Refills: 5 | Status: SHIPPED | OUTPATIENT
Start: 2018-10-15

## 2019-05-29 ENCOUNTER — OFFICE VISIT (OUTPATIENT)
Dept: ORTHOPEDIC SURGERY | Facility: CLINIC | Age: 84
End: 2019-05-29

## 2019-05-29 VITALS — BODY MASS INDEX: 25.4 KG/M2 | WEIGHT: 138 LBS | HEIGHT: 62 IN

## 2019-05-29 DIAGNOSIS — S42.201A CLOSED FRACTURE OF PROXIMAL END OF RIGHT HUMERUS, UNSPECIFIED FRACTURE MORPHOLOGY, INITIAL ENCOUNTER: Primary | ICD-10-CM

## 2019-05-29 DIAGNOSIS — S82.832A CLOSED FRACTURE OF DISTAL END OF LEFT FIBULA, UNSPECIFIED FRACTURE MORPHOLOGY, INITIAL ENCOUNTER: ICD-10-CM

## 2019-05-29 PROCEDURE — 27786 TREATMENT OF ANKLE FRACTURE: CPT | Performed by: ORTHOPAEDIC SURGERY

## 2019-05-29 PROCEDURE — 99203 OFFICE O/P NEW LOW 30 MIN: CPT | Performed by: ORTHOPAEDIC SURGERY

## 2019-05-29 PROCEDURE — 23600 CLTX PROX HUMRL FX W/O MNPJ: CPT | Performed by: ORTHOPAEDIC SURGERY

## 2019-05-29 NOTE — PROGRESS NOTES
Tahira Morrell is a 85 y.o. female   Primary provider:  Lincoln Piña MD       Chief Complaint   Patient presents with   • Right Upper Arm - Pain       HISTORY OF PRESENT ILLNESS: Patient is here today for multiple injuries. Patient fell down stairs on 5/23/2019. Patient had xrays of her ribs and right shoulder which did show a fracture of right right humerus and a fracture of her nose and patient's son states there is a fracture of her 6th rib as well. Patient was continuing to have pain upon a follow up with her primary care and she had xrays of her left ankle which showed a distal shaft fracture of the left fibula. Patient brought disc with xrays of her humerus today. Patient states that her pain is 8/10.  Prior to that she was living with her son but was doing very well is a household ambulator.  She is having home health come dress her wounds and has a significant wound on her left forearm that is all soft tissue and skin tears.  Pain was immediate worse with activity better with rest    History of Present Illness     CONCURRENT MEDICAL HISTORY:    Past Medical History:   Diagnosis Date   • Abdominal pain    • Abnormal weight loss    • Acquired hypothyroidism    • Acute bronchitis    • Acute sinusitis    • Adjustment disorder with depressed mood    • Anemia    • Backache    • Benign essential hypertension    • Bronchitis    • Chest pain     Chest wall injury   • Chronic colitis    • Cirrhosis of liver (CMS/HCC)    • Conduction disorder of the heart    • Contact dermatitis    • Cough    • Degeneration of lumbar intervertebral disc    • Degeneration of thoracic intervertebral disc    • Degenerative joint disease involving multiple joints    • Diarrhea    • Diverticular disease of colon    • Diverticulitis of colon    • Dyslipidemia    • Early satiety    • Essential hypertension    • Follow-up examination, following other surgery    • GERD (gastroesophageal reflux disease)    • H/O endoscopy    • Hepatic  encephalopathy (CMS/HCC)    • History of colon polyps    • History of malignant neoplasm of gastrointestinal tract    • Hyperglycemia    • Hyperlipidemia    • Infectious colitis    • Itching     of skin   • Non-healing surgical wound    • Palpitations    • Paroxysmal supraventricular tachycardia (CMS/HCC)    • Peptic ulcer    • Perforation of intestine (CMS/HCC)      history, ileocolonic anastomosis   • Pleural effusion    • Pruritus     unspecified   • Restless legs    • Rib pain    • Right upper quadrant pain    • Type 2 diabetes mellitus (CMS/HCC)    • Urinary tract infectious disease    • Vitamin B deficiency        Allergies   Allergen Reactions   • Band-Aid Anti Itch Gel [Camphor]    • Cefuroxime Axetil    • Mesalamine    • Neosporin [Neomycin-Bacitracin Zn-Polymyx]    • Neurontin [Gabapentin]    • Percocet [Oxycodone-Acetaminophen]    • Skelaxin [Metaxalone]    • Acetaminophen Nausea And Vomiting     Acetaminophen (from PERCOCET)   • Corticosteroids      Other reaction(s): Unknown         Current Outpatient Medications:   •  ciprofloxacin (CIPRO) 500 MG tablet, Take 1 tablet by mouth Every 12 (Twelve) Hours., Disp: 20 tablet, Rfl: 0  •  Cyanocobalamin 1000 MCG/ML kit, Inject 1,000 mcg as directed Every 30 (Thirty) Days. cyanocobalamin (vit B-12) 1,000 mcg/mL injection solution. 1 injection daily for 7 days then 1 injection monthly. , Disp: , Rfl:   •  dicyclomine (BENTYL) 20 MG tablet, TAKE ONE TABLET BY MOUTH EVERY 6 HOURS AS NEEDED FOR  IBS, Disp: 120 tablet, Rfl: 5  •  fluticasone (FLONASE) 50 MCG/ACT nasal spray, USE TWO SPRAY(S) IN EACH NOSTRIL ONCE DAILY, Disp: 16 g, Rfl: 1  •  furosemide (LASIX) 20 MG tablet, Take 1 tablet by mouth Daily., Disp: 30 tablet, Rfl: 3  •  hydrOXYzine (ATARAX) 25 MG tablet, TAKE ONE TABLET BY MOUTH EVERY 8 HOURS AS NEEDED, Disp: 60 tablet, Rfl: 5  •  levothyroxine (SYNTHROID) 50 MCG tablet, Take 1 tablet by mouth Daily., Disp: 30 tablet, Rfl: 6  •  LOPERAMIDE HCL PO, Take   by mouth., Disp: , Rfl:   •  loratadine (CLARITIN) 10 MG tablet, Take 10 mg by mouth as needed for allergies., Disp: , Rfl:   •  metoprolol succinate XL (TOPROL-XL) 25 MG 24 hr tablet, Take 1 tablet by mouth Daily., Disp: , Rfl:   •  omeprazole (priLOSEC) 20 MG capsule, TAKE ONE CAPSULE BY MOUTH TWICE DAILY, Disp: 60 capsule, Rfl: 5  •  rifaximin (XIFAXAN) 550 MG tablet, Take 550 mg by mouth 2 (two) times a day., Disp: , Rfl:   •  spironolactone (ALDACTONE) 50 MG tablet, Take 1 tablet by mouth 2 (Two) Times a Day., Disp: 60 tablet, Rfl: 5  •  sucralfate (CARAFATE) 1 g tablet, TAKE ONE TABLET BY MOUTH 4 TIMES DAILY BEFORE MEALS AND AT BEDTIME, Disp: 120 tablet, Rfl: 5  •  traMADol (ULTRAM) 50 MG tablet, Take 1 tablet by mouth Every 6 (Six) Hours As Needed for Moderate Pain ., Disp: 60 tablet, Rfl: 2    Past Surgical History:   Procedure Laterality Date   • ENDOSCOPY W/ PEG TUBE PLACEMENT  01/19/2013    EGD w/ tube 50658 (3)      • EXPLORATORY LAPAROTOMY  08/27/2008    Laparotomy, exploration (1)      • HYSTEROSCOPY TUBAL STERILIZATION  04/26/1976    Tubal sterilization (1)   Laparoscopic , multiparity   • INJECTION OF MEDICATION  06/09/2016    B12 (12)      • INJECTION OF MEDICATION  03/21/2016    Depo Medrol (Methylprednisone) 80mg (7)      • TOTAL ABDOMINAL HYSTERECTOMY WITH SALPINGO OOPHORECTOMY  06/02/1981    IRINEO/BSO (1)          Family History   Problem Relation Age of Onset   • Colon cancer Father    • Heart disease Mother    • Heart disease Daughter    • No Known Problems Maternal Aunt    • No Known Problems Maternal Uncle    • No Known Problems Paternal Aunt    • No Known Problems Paternal Uncle    • No Known Problems Maternal Grandmother    • No Known Problems Maternal Grandfather    • No Known Problems Paternal Grandmother    • No Known Problems Paternal Grandfather         Social History     Socioeconomic History   • Marital status:      Spouse name: Not on file   • Number of children: 2   • Years  "of education: Not on file   • Highest education level: Not on file   Tobacco Use   • Smoking status: Current Every Day Smoker     Packs/day: 0.25     Years: 2.00     Pack years: 0.50     Types: Cigarettes     Start date: 3/13/2015   • Smokeless tobacco: Never Used   Substance and Sexual Activity   • Alcohol use: No   • Drug use: No   • Sexual activity: No        Review of Systems   Constitutional: Positive for activity change, chills, fever and unexpected weight change.   HENT: Positive for hearing loss. Negative for facial swelling.    Eyes: Negative.    Respiratory: Negative.  Negative for apnea and shortness of breath.    Cardiovascular: Positive for palpitations. Negative for chest pain and leg swelling.   Gastrointestinal: Positive for abdominal pain. Negative for nausea and vomiting.   Endocrine: Negative.    Genitourinary: Negative.  Negative for dysuria.   Musculoskeletal: Positive for arthralgias, back pain, gait problem and joint swelling.   Skin: Negative.  Negative for color change.   Allergic/Immunologic: Negative.    Neurological: Negative for seizures and syncope.   Hematological: Negative for adenopathy. Bruises/bleeds easily.   Psychiatric/Behavioral: Negative.  Negative for dysphoric mood.       PHYSICAL EXAMINATION:       Ht 156.2 cm (61.5\")   Wt 62.6 kg (138 lb)   BMI 25.65 kg/m²     Physical Exam   Constitutional: She is oriented to person, place, and time. She appears well-developed.   HENT:   Head: Normocephalic and atraumatic.   Eyes: EOM are normal. Pupils are equal, round, and reactive to light.   Neck: Neck supple.   Pulmonary/Chest: Effort normal.   Musculoskeletal: She exhibits edema, tenderness and deformity.   Neurological: She is alert and oriented to person, place, and time.   Skin: Skin is warm and dry.   Psychiatric: She has a normal mood and affect.       GAIT:     []  Normal  []  Antalgic    Assistive device: []  None  []  Walker     []  Crutches  []  Cane     [x]  " Wheelchair  []  Stretcher    Ortho Exam  Arm is tender in a sling marked ecchymosis and pain with motion.  Neurologically intact distally.  Big bandage on the left forearm which we have not removed.  Multiple abrasions on the lower left extremity tender and swollen laterally with ecchymosis of the lateral malleolus and to lesser extent medially she appears neurologically intact.    No results found.  Crucial significant osteopenia with impaction and mild varus of the humeral head on the right.  She has essentially non-displaced lateral malleolar fracture which is very subtle seen on the AP view only.      ASSESSMENT:    Diagnoses and all orders for this visit:    Closed fracture of proximal end of right humerus, unspecified fracture morphology, initial encounter    Closed fracture of distal end of left fibula, unspecified fracture morphology, initial encounter          PLAN we will try a small walker boot in the left physical therapy for the right shoulder.  Continue with health care and dressing changes they can take the boot off to address this we will see her back repeat an x-ray in about 2 weeks of the right shoulder and the left ankle    No Follow-up on file.        This document has been electronically signed by Fredy Rankin MD on May 29, 2019 4:50 PM

## 2019-06-11 DIAGNOSIS — S82.832A CLOSED FRACTURE OF DISTAL END OF LEFT FIBULA, UNSPECIFIED FRACTURE MORPHOLOGY, INITIAL ENCOUNTER: ICD-10-CM

## 2019-06-11 DIAGNOSIS — S42.201A CLOSED FRACTURE OF PROXIMAL END OF RIGHT HUMERUS, UNSPECIFIED FRACTURE MORPHOLOGY, INITIAL ENCOUNTER: Primary | ICD-10-CM

## 2019-06-12 ENCOUNTER — OFFICE VISIT (OUTPATIENT)
Dept: ORTHOPEDIC SURGERY | Facility: CLINIC | Age: 84
End: 2019-06-12

## 2019-06-12 VITALS — BODY MASS INDEX: 26.06 KG/M2 | HEIGHT: 61 IN | WEIGHT: 138 LBS

## 2019-06-12 DIAGNOSIS — S82.832A CLOSED FRACTURE OF DISTAL END OF LEFT FIBULA, UNSPECIFIED FRACTURE MORPHOLOGY, INITIAL ENCOUNTER: ICD-10-CM

## 2019-06-12 DIAGNOSIS — S42.201A CLOSED FRACTURE OF PROXIMAL END OF RIGHT HUMERUS, UNSPECIFIED FRACTURE MORPHOLOGY, INITIAL ENCOUNTER: Primary | ICD-10-CM

## 2019-06-12 DIAGNOSIS — M25.571 RIGHT ANKLE PAIN, UNSPECIFIED CHRONICITY: Primary | ICD-10-CM

## 2019-06-12 PROCEDURE — 99024 POSTOP FOLLOW-UP VISIT: CPT | Performed by: ORTHOPAEDIC SURGERY

## 2019-06-12 NOTE — PROGRESS NOTES
Tahira Morrell is a 85 y.o. female returns for     Chief Complaint   Patient presents with   • Left Leg - Pain       HISTORY OF PRESENT ILLNESS: f/u on left leg, patient requested xrays today, patient son states that physical therapy is concerned about the right shoulder popping a lot more, patient states that her leg hurts only with touching, and has swelling.  The son worried about it because 1 of the physical therapist told him that something was wrong with his mother shoulder.  She is got some new pain in her right tibia underneath where the wound is on her right shin.  The left ankle is doing better.     CONCURRENT MEDICAL HISTORY:    Past Medical History:   Diagnosis Date   • Abdominal pain    • Abnormal weight loss    • Acquired hypothyroidism    • Acute bronchitis    • Acute sinusitis    • Adjustment disorder with depressed mood    • Anemia    • Backache    • Benign essential hypertension    • Bronchitis    • Chest pain     Chest wall injury   • Chronic colitis    • Cirrhosis of liver (CMS/HCC)    • Conduction disorder of the heart    • Contact dermatitis    • Cough    • Degeneration of lumbar intervertebral disc    • Degeneration of thoracic intervertebral disc    • Degenerative joint disease involving multiple joints    • Diarrhea    • Diverticular disease of colon    • Diverticulitis of colon    • Dyslipidemia    • Early satiety    • Essential hypertension    • Follow-up examination, following other surgery    • GERD (gastroesophageal reflux disease)    • H/O endoscopy    • Hepatic encephalopathy (CMS/HCC)    • History of colon polyps    • History of malignant neoplasm of gastrointestinal tract    • Hyperglycemia    • Hyperlipidemia    • Infectious colitis    • Itching     of skin   • Non-healing surgical wound    • Palpitations    • Paroxysmal supraventricular tachycardia (CMS/HCC)    • Peptic ulcer    • Perforation of intestine (CMS/HCC)      history, ileocolonic anastomosis   • Pleural effusion    •  Pruritus     unspecified   • Restless legs    • Rib pain    • Right upper quadrant pain    • Type 2 diabetes mellitus (CMS/HCC)    • Urinary tract infectious disease    • Vitamin B deficiency        Allergies   Allergen Reactions   • Band-Aid Anti Itch Gel [Camphor]    • Cefuroxime Axetil    • Mesalamine    • Neosporin [Neomycin-Bacitracin Zn-Polymyx]    • Neurontin [Gabapentin]    • Percocet [Oxycodone-Acetaminophen]    • Skelaxin [Metaxalone]    • Acetaminophen Nausea And Vomiting     Acetaminophen (from PERCOCET)   • Corticosteroids      Other reaction(s): Unknown         Current Outpatient Medications:   •  ciprofloxacin (CIPRO) 500 MG tablet, Take 1 tablet by mouth Every 12 (Twelve) Hours., Disp: 20 tablet, Rfl: 0  •  Cyanocobalamin 1000 MCG/ML kit, Inject 1,000 mcg as directed Every 30 (Thirty) Days. cyanocobalamin (vit B-12) 1,000 mcg/mL injection solution. 1 injection daily for 7 days then 1 injection monthly. , Disp: , Rfl:   •  dicyclomine (BENTYL) 20 MG tablet, TAKE ONE TABLET BY MOUTH EVERY 6 HOURS AS NEEDED FOR  IBS, Disp: 120 tablet, Rfl: 5  •  fluticasone (FLONASE) 50 MCG/ACT nasal spray, USE TWO SPRAY(S) IN EACH NOSTRIL ONCE DAILY, Disp: 16 g, Rfl: 1  •  furosemide (LASIX) 20 MG tablet, Take 1 tablet by mouth Daily., Disp: 30 tablet, Rfl: 3  •  hydrOXYzine (ATARAX) 25 MG tablet, TAKE ONE TABLET BY MOUTH EVERY 8 HOURS AS NEEDED, Disp: 60 tablet, Rfl: 5  •  levothyroxine (SYNTHROID) 50 MCG tablet, Take 1 tablet by mouth Daily., Disp: 30 tablet, Rfl: 6  •  LOPERAMIDE HCL PO, Take  by mouth., Disp: , Rfl:   •  loratadine (CLARITIN) 10 MG tablet, Take 10 mg by mouth as needed for allergies., Disp: , Rfl:   •  metoprolol succinate XL (TOPROL-XL) 25 MG 24 hr tablet, Take 1 tablet by mouth Daily., Disp: , Rfl:   •  omeprazole (priLOSEC) 20 MG capsule, TAKE ONE CAPSULE BY MOUTH TWICE DAILY, Disp: 60 capsule, Rfl: 5  •  rifaximin (XIFAXAN) 550 MG tablet, Take 550 mg by mouth 2 (two) times a day., Disp: , Rfl:  "  •  spironolactone (ALDACTONE) 50 MG tablet, Take 1 tablet by mouth 2 (Two) Times a Day., Disp: 60 tablet, Rfl: 5  •  sucralfate (CARAFATE) 1 g tablet, TAKE ONE TABLET BY MOUTH 4 TIMES DAILY BEFORE MEALS AND AT BEDTIME, Disp: 120 tablet, Rfl: 5  •  traMADol (ULTRAM) 50 MG tablet, Take 1 tablet by mouth Every 6 (Six) Hours As Needed for Moderate Pain ., Disp: 60 tablet, Rfl: 2    Past Surgical History:   Procedure Laterality Date   • ENDOSCOPY W/ PEG TUBE PLACEMENT  01/19/2013    EGD w/ tube 29933 (3)      • EXPLORATORY LAPAROTOMY  08/27/2008    Laparotomy, exploration (1)      • HYSTEROSCOPY TUBAL STERILIZATION  04/26/1976    Tubal sterilization (1)   Laparoscopic , multiparity   • INJECTION OF MEDICATION  06/09/2016    B12 (12)      • INJECTION OF MEDICATION  03/21/2016    Depo Medrol (Methylprednisone) 80mg (7)      • TOTAL ABDOMINAL HYSTERECTOMY WITH SALPINGO OOPHORECTOMY  06/02/1981    IRINEO/BSO (1)              ROS: Review of systems has been updated as of today's date.  All other systems are negative except as noted previously.    PHYSICAL EXAMINATION:       Ht 154.9 cm (61\")   Wt 62.6 kg (138 lb)   BMI 26.07 kg/m²     Physical Exam    GAIT:     []  Normal  []  Antalgic    Assistive device: []  None  []  Walker     []  Crutches  []  Cane     [x]  Wheelchair  []  Stretcher    Ortho Exam  The right hand to her mouth and get to her glasses can get to the top of her head limited external rotation is expected she is neurologically intact swelling is less.  Minimal tenderness over the ankle just at the very tip of the lateral malleolus on the right side.  The ankle is mildly swollen but not tender on the right side she is tender about the wound has been surrounding erythema no obvious induration or infection it appears to be healing relatively well over the anterior tibia.    No results found.    3 views of the right ankle and tibia show relative osteopenia but no fracture.  3 views of the left ankle show previous " small avulsion fracture.  3 views of the right humerus show impacted fracture with relative osteopenia with no change in position.      ASSESSMENT:    Diagnoses and all orders for this visit:    Closed fracture of proximal end of right humerus, unspecified fracture morphology, initial encounter    Closed fracture of distal end of left fibula, unspecified fracture morphology, initial encounter          PLAN I reassured the patient wanted to use the arm for activities of daily living.  Biggest problem is stiffness she is on her therapy protocol with regard to her right leg we have dressed it today they can continue to clean it with soap and water keep it clean and I suspect this will heal.  Certainly have some concern over the softness of her bone.  With regard to the left ankle she can come out of the boot start range of motion but unaware when she is up for another 3 weeks.  She can come in at that point which were about the first week and July and we will see her back in about that point x-ray ankle and shoulder on arrival  No Follow-up on file.      This document has been electronically signed by Fredy Rankin MD on June 12, 2019 12:42 PM

## 2019-07-03 DIAGNOSIS — S42.201A CLOSED FRACTURE OF PROXIMAL END OF RIGHT HUMERUS, UNSPECIFIED FRACTURE MORPHOLOGY, INITIAL ENCOUNTER: Primary | ICD-10-CM

## 2019-07-03 DIAGNOSIS — S82.832A CLOSED FRACTURE OF DISTAL END OF LEFT FIBULA, UNSPECIFIED FRACTURE MORPHOLOGY, INITIAL ENCOUNTER: ICD-10-CM

## 2019-07-05 ENCOUNTER — OFFICE VISIT (OUTPATIENT)
Dept: ORTHOPEDIC SURGERY | Facility: CLINIC | Age: 84
End: 2019-07-05

## 2019-07-05 VITALS — BODY MASS INDEX: 26.06 KG/M2 | WEIGHT: 138 LBS | HEIGHT: 61 IN

## 2019-07-05 DIAGNOSIS — S82.832A CLOSED FRACTURE OF DISTAL END OF LEFT FIBULA, UNSPECIFIED FRACTURE MORPHOLOGY, INITIAL ENCOUNTER: ICD-10-CM

## 2019-07-05 DIAGNOSIS — S42.201A CLOSED FRACTURE OF PROXIMAL END OF RIGHT HUMERUS, UNSPECIFIED FRACTURE MORPHOLOGY, INITIAL ENCOUNTER: Primary | ICD-10-CM

## 2019-07-05 PROCEDURE — 99024 POSTOP FOLLOW-UP VISIT: CPT | Performed by: ORTHOPAEDIC SURGERY

## 2019-07-05 NOTE — PROGRESS NOTES
Tahira Morrell is a 85 y.o. female returns for     Chief Complaint   Patient presents with   • Right Arm - Follow-up       HISTORY OF PRESENT ILLNESS: f/u on right arm, patient had xrays today, no pain today ankle does not hurt.  Good motion the shoulder is really improved significantly       CONCURRENT MEDICAL HISTORY:    Past Medical History:   Diagnosis Date   • Abdominal pain    • Abnormal weight loss    • Acquired hypothyroidism    • Acute bronchitis    • Acute sinusitis    • Adjustment disorder with depressed mood    • Anemia    • Backache    • Benign essential hypertension    • Bronchitis    • Chest pain     Chest wall injury   • Chronic colitis    • Cirrhosis of liver (CMS/HCC)    • Conduction disorder of the heart    • Contact dermatitis    • Cough    • Degeneration of lumbar intervertebral disc    • Degeneration of thoracic intervertebral disc    • Degenerative joint disease involving multiple joints    • Diarrhea    • Diverticular disease of colon    • Diverticulitis of colon    • Dyslipidemia    • Early satiety    • Essential hypertension    • Follow-up examination, following other surgery    • GERD (gastroesophageal reflux disease)    • H/O endoscopy    • Hepatic encephalopathy (CMS/HCC)    • History of colon polyps    • History of malignant neoplasm of gastrointestinal tract    • Hyperglycemia    • Hyperlipidemia    • Infectious colitis    • Itching     of skin   • Non-healing surgical wound    • Palpitations    • Paroxysmal supraventricular tachycardia (CMS/HCC)    • Peptic ulcer    • Perforation of intestine (CMS/HCC)      history, ileocolonic anastomosis   • Pleural effusion    • Pruritus     unspecified   • Restless legs    • Rib pain    • Right upper quadrant pain    • Type 2 diabetes mellitus (CMS/HCC)    • Urinary tract infectious disease    • Vitamin B deficiency        Allergies   Allergen Reactions   • Band-Aid Anti Itch Gel [Camphor]    • Cefuroxime Axetil    • Mesalamine    • Neosporin  [Neomycin-Bacitracin Zn-Polymyx]    • Neurontin [Gabapentin]    • Percocet [Oxycodone-Acetaminophen]    • Skelaxin [Metaxalone]    • Acetaminophen Nausea And Vomiting     Acetaminophen (from PERCOCET)   • Corticosteroids      Other reaction(s): Unknown         Current Outpatient Medications:   •  ciprofloxacin (CIPRO) 500 MG tablet, Take 1 tablet by mouth Every 12 (Twelve) Hours., Disp: 20 tablet, Rfl: 0  •  Cyanocobalamin 1000 MCG/ML kit, Inject 1,000 mcg as directed Every 30 (Thirty) Days. cyanocobalamin (vit B-12) 1,000 mcg/mL injection solution. 1 injection daily for 7 days then 1 injection monthly. , Disp: , Rfl:   •  dicyclomine (BENTYL) 20 MG tablet, TAKE ONE TABLET BY MOUTH EVERY 6 HOURS AS NEEDED FOR  IBS, Disp: 120 tablet, Rfl: 5  •  fluticasone (FLONASE) 50 MCG/ACT nasal spray, USE TWO SPRAY(S) IN EACH NOSTRIL ONCE DAILY, Disp: 16 g, Rfl: 1  •  furosemide (LASIX) 20 MG tablet, Take 1 tablet by mouth Daily., Disp: 30 tablet, Rfl: 3  •  hydrOXYzine (ATARAX) 25 MG tablet, TAKE ONE TABLET BY MOUTH EVERY 8 HOURS AS NEEDED, Disp: 60 tablet, Rfl: 5  •  levothyroxine (SYNTHROID) 50 MCG tablet, Take 1 tablet by mouth Daily., Disp: 30 tablet, Rfl: 6  •  LOPERAMIDE HCL PO, Take  by mouth., Disp: , Rfl:   •  loratadine (CLARITIN) 10 MG tablet, Take 10 mg by mouth as needed for allergies., Disp: , Rfl:   •  metoprolol succinate XL (TOPROL-XL) 25 MG 24 hr tablet, Take 1 tablet by mouth Daily., Disp: , Rfl:   •  omeprazole (priLOSEC) 20 MG capsule, TAKE ONE CAPSULE BY MOUTH TWICE DAILY, Disp: 60 capsule, Rfl: 5  •  rifaximin (XIFAXAN) 550 MG tablet, Take 550 mg by mouth 2 (two) times a day., Disp: , Rfl:   •  spironolactone (ALDACTONE) 50 MG tablet, Take 1 tablet by mouth 2 (Two) Times a Day., Disp: 60 tablet, Rfl: 5  •  sucralfate (CARAFATE) 1 g tablet, TAKE ONE TABLET BY MOUTH 4 TIMES DAILY BEFORE MEALS AND AT BEDTIME, Disp: 120 tablet, Rfl: 5  •  traMADol (ULTRAM) 50 MG tablet, Take 1 tablet by mouth Every 6 (Six)  "Hours As Needed for Moderate Pain ., Disp: 60 tablet, Rfl: 2    Past Surgical History:   Procedure Laterality Date   • ENDOSCOPY W/ PEG TUBE PLACEMENT  01/19/2013    EGD w/ tube 37181 (3)      • EXPLORATORY LAPAROTOMY  08/27/2008    Laparotomy, exploration (1)      • HYSTEROSCOPY TUBAL STERILIZATION  04/26/1976    Tubal sterilization (1)   Laparoscopic , multiparity   • INJECTION OF MEDICATION  06/09/2016    B12 (12)      • INJECTION OF MEDICATION  03/21/2016    Depo Medrol (Methylprednisone) 80mg (7)      • TOTAL ABDOMINAL HYSTERECTOMY WITH SALPINGO OOPHORECTOMY  06/02/1981    IRINEO/BSO (1)              ROS: Review of systems has been updated as of today's date.  All other systems are negative except as noted previously.    PHYSICAL EXAMINATION:       Ht 154.9 cm (61\")   Wt 62.6 kg (138 lb)   BMI 26.07 kg/m²     Physical Exam    GAIT:     []  Normal  []  Antalgic    Assistive device: []  None  []  Walker     []  Crutches  []  Cane     [x]  Wheelchair  []  Stretcher    Ortho Exam  Tender about the ankle.  Good motion ankle minimal swelling.  Able to forward flex the shoulder probably 830 degrees abduction a little bit limited pretty good rotational motion.  Xr Shoulder 2+ View Right    Result Date: 6/19/2019  Narrative: 3 views right shoulder Comparison prior film Impacted proximal humerus fracture with minimal displacement.  No change from prior film. Impression: Healing proximal humerus fracture with impaction right    Xr Tibia Fibula 2 View Right    Result Date: 6/19/2019  Narrative: FINDINGS: 2 view demonstrate no fracture, periosteal reaction or other acute osseous abnormality.     Impression:  Negative study.    Xr Ankle 3+ View Left    Result Date: 6/19/2019  Narrative: 3 views left ankle Comparison prior film Significant osteopenia again seen.  History of old transverse fracture distal lateral malleolus which is certainly stable on this view.  Fracture is poorly visualized. Impression: No change in known " lateral malleolar fracture.    Xr Ankle 3+ View Right    Result Date: 6/19/2019  Narrative: CLINICAL: Right ankle pain FINDINGS: AP, lateral, and oblique views were obtained of the right ankle. The bones are intact. The ankle mortise is congruent. The bones, joints, and soft tissues appear normal.  Relative osteopenia and no compared to films     Impression: Negative ankle.  Humeral x-rays show still impaction mild deformity of the humerus but doing well.  3 views of the ankle show osteopenia fracture line is not visible          ASSESSMENT:    Diagnoses and all orders for this visit:    Closed fracture of proximal end of right humerus, unspecified fracture morphology, initial encounter    Closed fracture of distal end of left fibula, unspecified fracture morphology, initial encounter          PLAN this point she can weight-bear as tolerated.  Do all her activities of daily living with her arm.  Call with any problems I do not need to x-ray again unless she has any problems we will see her back at that time.    No Follow-up on file.      This document has been electronically signed by Fredy Rankin MD on July 5, 2019 4:47 PM